# Patient Record
Sex: MALE | Race: WHITE | Employment: UNEMPLOYED | URBAN - NONMETROPOLITAN AREA
[De-identification: names, ages, dates, MRNs, and addresses within clinical notes are randomized per-mention and may not be internally consistent; named-entity substitution may affect disease eponyms.]

---

## 2017-01-25 ENCOUNTER — INITIAL CONSULT (OUTPATIENT)
Dept: SURGERY | Age: 42
End: 2017-01-25

## 2017-01-25 VITALS
WEIGHT: 252 LBS | DIASTOLIC BLOOD PRESSURE: 78 MMHG | SYSTOLIC BLOOD PRESSURE: 128 MMHG | HEART RATE: 100 BPM | BODY MASS INDEX: 31.33 KG/M2 | HEIGHT: 75 IN | TEMPERATURE: 97.1 F

## 2017-01-25 DIAGNOSIS — N45.1 EPIDIDYMITIS: Primary | ICD-10-CM

## 2017-01-25 PROCEDURE — 99203 OFFICE O/P NEW LOW 30 MIN: CPT | Performed by: SURGERY

## 2017-01-25 RX ORDER — LEVOFLOXACIN 750 MG/1
750 TABLET ORAL DAILY
Qty: 10 TABLET | Refills: 0 | Status: SHIPPED | OUTPATIENT
Start: 2017-01-25 | End: 2017-02-04

## 2017-01-25 ASSESSMENT — ENCOUNTER SYMPTOMS
EYES NEGATIVE: 1
GASTROINTESTINAL NEGATIVE: 1
COUGH: 1

## 2017-02-08 ENCOUNTER — OFFICE VISIT (OUTPATIENT)
Dept: SURGERY | Age: 42
End: 2017-02-08

## 2017-02-08 VITALS
WEIGHT: 260 LBS | BODY MASS INDEX: 31.66 KG/M2 | DIASTOLIC BLOOD PRESSURE: 90 MMHG | TEMPERATURE: 98.6 F | HEIGHT: 76 IN | SYSTOLIC BLOOD PRESSURE: 158 MMHG | HEART RATE: 88 BPM

## 2017-02-08 DIAGNOSIS — N45.1 EPIDIDYMITIS: Primary | ICD-10-CM

## 2017-02-08 PROCEDURE — 99212 OFFICE O/P EST SF 10 MIN: CPT | Performed by: SURGERY

## 2017-11-01 ENCOUNTER — INITIAL CONSULT (OUTPATIENT)
Dept: SURGERY | Age: 42
End: 2017-11-01
Payer: MEDICAID

## 2017-11-01 VITALS
HEIGHT: 76 IN | SYSTOLIC BLOOD PRESSURE: 142 MMHG | WEIGHT: 270 LBS | HEART RATE: 89 BPM | BODY MASS INDEX: 32.88 KG/M2 | TEMPERATURE: 98.2 F | DIASTOLIC BLOOD PRESSURE: 98 MMHG

## 2017-11-01 DIAGNOSIS — R10.13 EPIGASTRIC PAIN: Primary | ICD-10-CM

## 2017-11-01 DIAGNOSIS — Z72.0 TOBACCO ABUSE: ICD-10-CM

## 2017-11-01 DIAGNOSIS — K21.9 GASTROESOPHAGEAL REFLUX DISEASE, ESOPHAGITIS PRESENCE NOT SPECIFIED: ICD-10-CM

## 2017-11-01 PROCEDURE — G8484 FLU IMMUNIZE NO ADMIN: HCPCS | Performed by: SURGERY

## 2017-11-01 PROCEDURE — 4004F PT TOBACCO SCREEN RCVD TLK: CPT | Performed by: SURGERY

## 2017-11-01 PROCEDURE — G8417 CALC BMI ABV UP PARAM F/U: HCPCS | Performed by: SURGERY

## 2017-11-01 PROCEDURE — G8427 DOCREV CUR MEDS BY ELIG CLIN: HCPCS | Performed by: SURGERY

## 2017-11-01 PROCEDURE — 99214 OFFICE O/P EST MOD 30 MIN: CPT | Performed by: SURGERY

## 2017-11-01 RX ORDER — AZELASTINE 1 MG/ML
2 SPRAY, METERED NASAL
COMMUNITY
Start: 2017-06-12

## 2017-11-01 ASSESSMENT — ENCOUNTER SYMPTOMS
HEARTBURN: 1
ABDOMINAL DISTENTION: 1
HOARSE VOICE: 1
ABDOMINAL PAIN: 1
HEMATOCHEZIA: 0
COUGH: 1
BLOOD IN STOOL: 0
DIARRHEA: 0
WHEEZING: 1
CONSTIPATION: 1
SORE THROAT: 0
SHORTNESS OF BREATH: 1
TROUBLE SWALLOWING: 0
NAUSEA: 1
VOMITING: 1
VOICE CHANGE: 1

## 2017-11-01 ASSESSMENT — CROHNS DISEASE ACTIVITY INDEX (CDAI): CDAI SCORE: 0

## 2017-11-01 NOTE — LETTER
AMBULATORY SURGERY INSTRUCTIONS    - If you should develop a cold, sore throat, cough, fever or other new indication of illness prior to the scheduled surgery, please notify the [address] [place] [address] office as early as possible. - DO NOT EAT OR DRINK ANYTHING AFTER MIDNIGHT THE NIGHT BEFORE YOUR SURGERY. This includes water, tea, juice, cereal, coffee, etc.    - Refrain from smoking the day of your surgery or procedure. - Wear simple loose clothing, which can be easily changed. - Do not wear any make-up, lipstick or nail polish. - Please leave contact lenses at home or bring container for them. - Leave jewelry (including rings) and other valuables at home. - Make arrangements for a family member or friend to drive you to and from the surgery center. The anesthetic may affect your judgement following surgery and driving a vehicle within 24 hours after the anesthesia could be dangerous. Please remember that a responsible adult must remain in the building at all times during your surgery. - Children should be accompanied by two adults; one to watch the child, the other to drive the vehicle home. - Please shower or bathe both on the evening prior to surgery and on the morning of surgery using an antibacterial soap/Hibiclens    - You may be asked to sign several forms prior to surgery; patients under age 25 have a parent or legal guardian sign the permit to operate.    - DO NOT take aspirin, Aleve, Motrin, Ibuprofen, Naproxen, Vitamins or Herbal supplements for 1 weeks or 7 days prior to the day of surgery.    -The morning of your procedure please take blood pressure, heart, and seizure medications with a small sip of water. Day of procedure report to the [facility], Registration office on the 1st floor in the hospital, after check in you will then go to the second floor.        [facility] requests that all medications are kept in their original bottles and brought to the procedure. PROCEDURE DATE:                                  Estimated surgery arrival time     You will be notified the day before surgery (usually in the afternoon) of your arrival time by the surgery center.

## 2017-11-01 NOTE — PROGRESS NOTES
into the muscle every 30 days      triamcinolone (KENALOG) 0.1 % cream apply to affected area twice a day TO ITCHY AREA ON RIGHT CHEST       No current facility-administered medications for this visit. Allergies   Allergen Reactions    Penicillin V Hives     Social History   Substance Use Topics    Smoking status: Current Every Day Smoker     Packs/day: 2.00    Smokeless tobacco: Former User    Alcohol use No     Family History   Problem Relation Age of Onset    Cancer Father      skin     Review of Systems   Constitutional: Negative for activity change, appetite change, fever, unexpected weight change and weight loss. HENT: Positive for hoarse voice and voice change. Negative for sore throat and trouble swallowing. Respiratory: Positive for cough, shortness of breath and wheezing. Cardiovascular: Negative for chest pain. Gastrointestinal: Positive for abdominal distention, abdominal pain, constipation, heartburn, nausea and vomiting. Negative for anorexia, blood in stool, diarrhea and hematochezia. Melena:  black stools. Genitourinary: Positive for hematuria. Negative for difficulty urinating and dysuria. Musculoskeletal: Negative for arthralgias and myalgias. Skin: Negative for rash and wound. Neurological: Positive for numbness ( right leg) and headaches. Negative for dizziness, seizures, syncope, speech difficulty, weakness and light-headedness. Hematological: Does not bruise/bleed easily. Psychiatric/Behavioral: Positive for agitation, behavioral problems, confusion, decreased concentration and dysphoric mood. The patient is nervous/anxious. BP (!) 142/98   Pulse 89   Temp 98.2 °F (36.8 °C) (Temporal)   Ht 6' 4\" (1.93 m)   Wt 270 lb (122.5 kg)   BMI 32.87 kg/m²     Objective:   Physical Exam   Constitutional: He is oriented to person, place, and time. He appears well-developed and well-nourished. No distress. HENT:   Head: Normocephalic and atraumatic.

## 2020-01-30 ENCOUNTER — OFFICE VISIT (OUTPATIENT)
Dept: WOUND CARE | Age: 45
End: 2020-01-30
Payer: MEDICARE

## 2020-01-30 VITALS
HEIGHT: 76 IN | BODY MASS INDEX: 33.61 KG/M2 | WEIGHT: 276 LBS | DIASTOLIC BLOOD PRESSURE: 88 MMHG | TEMPERATURE: 98.5 F | SYSTOLIC BLOOD PRESSURE: 168 MMHG | HEART RATE: 88 BPM

## 2020-01-30 PROCEDURE — 29580 STRAPPING UNNA BOOT: CPT | Performed by: SURGERY

## 2020-01-30 PROCEDURE — 99202 OFFICE O/P NEW SF 15 MIN: CPT | Performed by: SURGERY

## 2020-01-30 RX ORDER — HYDROCHLOROTHIAZIDE 12.5 MG/1
CAPSULE, GELATIN COATED ORAL
COMMUNITY
Start: 2020-01-28

## 2020-01-30 RX ORDER — SULFAMETHOXAZOLE AND TRIMETHOPRIM 800; 160 MG/1; MG/1
TABLET ORAL
COMMUNITY
Start: 2020-01-28 | End: 2020-02-13

## 2020-01-30 RX ORDER — BUMETANIDE 1 MG/1
TABLET ORAL
COMMUNITY
Start: 2020-01-28

## 2020-01-30 RX ORDER — FUROSEMIDE 20 MG/1
TABLET ORAL
COMMUNITY
Start: 2019-11-06 | End: 2020-02-13

## 2020-01-30 NOTE — PROGRESS NOTES
Patient is referred with ulcer on his legs. It is not clear how long they have been present. He has poorly controlled CHF. Smokes cigarettes and probably is an alcoholic. He denies drinking today, but has a strong smell of alcohol. Past Medical History:   Diagnosis Date    Depression     Elevated BP     Genital warts     History of broken nose     Nicotine dependence      Past Surgical History:   Procedure Laterality Date    WISDOM TOOTH EXTRACTION       Current Outpatient Medications   Medication Sig Dispense Refill    bumetanide (BUMEX) 1 MG tablet       hydrochlorothiazide (MICROZIDE) 12.5 MG capsule       furosemide (LASIX) 20 MG tablet       sulfamethoxazole-trimethoprim (BACTRIM DS;SEPTRA DS) 800-160 MG per tablet       azelastine (ASTELIN) 0.1 % nasal spray 2 sprays by Nasal route      ARIPiprazole (ABILIFY MAINTENA) 400 MG SUSR Inject 400 mg into the muscle every 30 days      triamcinolone (KENALOG) 0.1 % cream apply to affected area twice a day TO ITCHY AREA ON RIGHT CHEST       No current facility-administered medications for this visit. Allergies   Allergen Reactions    Penicillin V Hives     Social History     Tobacco Use    Smoking status: Current Every Day Smoker     Packs/day: 2.00    Smokeless tobacco: Former User   Substance Use Topics    Alcohol use: Yes    Drug use: No     Family History   Problem Relation Age of Onset    Cancer Father         skin       Temp 98.5 °F (36.9 °C) (Tympanic)   Ht 6' 3.98\" (1.93 m)   Wt 276 lb (125.2 kg)   BMI 33.61 kg/m²     Patient is somnolent. Slurring his speech. He has multiple circular ulcer on his legs bilaterally. At least one of these on the right penetrates into the subcutaneous tissue. All are fairly small less than 2 cm across. He has marked pitting edema bilaterally. He has good palpable pedal pulses bilaterally. Lab work form yesterday shows very low albumin. Elevated BNP.   TSH is normal.    IMP/PLAN  1) Multiple small ulcer on legs bilaterally. Probably mixed etiology. Consider erythema nodosum, pyogenic granulomas, venous stasis ulcer, folliculitis, purigo nodularis. - Will try Unna boots.   Aggressive compression is contra-indicated with CHF  2) Severe Hypoalbuminemia  3) Tobacco abuse  4) Alcohol abuse  5) Recheck in 1 week  6) Acute Intoxication

## 2020-01-30 NOTE — PATIENT INSTRUCTIONS
Today a compression dressing has been applied to your lower leg. Its purpose is to reduce swelling and help treat your wound. It will stay on until your next appointment. 1. It must stay dry. You can shower with a bag covering it or purchase a shower boot at  a medical supply store. 2. If the dressing falls more than 2 inches or gets wet, call us ([phone]) to come in  to have it changed. 3. If the top or bottom rolls, you can snip through it to relieve the constriction. 4. To help reduce swelling, when sitting elevate your leg, preferably to heart level. Avoid standing in one place for long periods of time. 5.  A rare complication is a decrease of arterial blood flow to your toes. If your   leg becomes more painful with numbness or tingling or a purple color to your toes,  carefully remove the dressing by cutting it off or unwrapping it. If normal sensation does not return to the limb, seek medical help.

## 2020-02-03 ENCOUNTER — TELEPHONE (OUTPATIENT)
Dept: SURGERY | Age: 45
End: 2020-02-03

## 2020-02-03 NOTE — TELEPHONE ENCOUNTER
Patient called stating the wraps were so itching, he couldn't stand it any longer so he took the wraps off, but wanted to double ck to see when his next appt was, let him know when his next appt was. 2/5/2020 .

## 2020-02-05 ENCOUNTER — OFFICE VISIT (OUTPATIENT)
Dept: SURGERY | Age: 45
End: 2020-02-05
Payer: MEDICARE

## 2020-02-05 VITALS
DIASTOLIC BLOOD PRESSURE: 80 MMHG | WEIGHT: 274 LBS | SYSTOLIC BLOOD PRESSURE: 134 MMHG | RESPIRATION RATE: 18 BRPM | HEART RATE: 81 BPM | BODY MASS INDEX: 33.36 KG/M2 | TEMPERATURE: 98.8 F | HEIGHT: 76 IN

## 2020-02-05 PROBLEM — L97.221 VENOUS STASIS ULCER OF LEFT CALF LIMITED TO BREAKDOWN OF SKIN WITHOUT VARICOSE VEINS (HCC): Status: ACTIVE | Noted: 2020-02-05

## 2020-02-05 PROBLEM — I87.2 VENOUS STASIS ULCER OF RIGHT CALF WITH FAT LAYER EXPOSED WITHOUT VARICOSE VEINS (HCC): Status: ACTIVE | Noted: 2020-02-05

## 2020-02-05 PROBLEM — I87.2 VENOUS STASIS ULCER OF LEFT CALF LIMITED TO BREAKDOWN OF SKIN WITHOUT VARICOSE VEINS (HCC): Status: ACTIVE | Noted: 2020-02-05

## 2020-02-05 PROBLEM — L97.212 VENOUS STASIS ULCER OF RIGHT CALF WITH FAT LAYER EXPOSED WITHOUT VARICOSE VEINS (HCC): Status: ACTIVE | Noted: 2020-02-05

## 2020-02-05 PROBLEM — I50.43 CHF (CONGESTIVE HEART FAILURE), NYHA CLASS III, ACUTE ON CHRONIC, COMBINED (HCC): Status: ACTIVE | Noted: 2019-12-17

## 2020-02-05 PROCEDURE — 29580 STRAPPING UNNA BOOT: CPT | Performed by: SURGERY

## 2020-02-05 NOTE — PATIENT INSTRUCTIONS
Today a compression dressing has been applied to your lower leg. Its purpose is to reduce swelling and help treat your wound. It will stay on until your next appointment. 1. It must stay dry. You can shower with a bag covering it or purchase a shower boot at  a medical supply store. 2. If the dressing falls more than 2 inches or gets wet, call us ([phone]) to come in  to have it changed. 3. If the top or bottom rolls, you can snip through it to relieve the constriction. 4. To help reduce swelling, when sitting elevate your leg, preferably to heart level. Avoid standing in one place for long periods of time. 5.  A rare complication is a decrease of arterial blood flow to your toes. If your   leg becomes more painful with numbness or tingling or a purple color to your toes,  carefully remove the dressing by cutting it off or unwrapping it. If normal sensation does not return to the limb, seek medical help.

## 2020-02-13 ENCOUNTER — OFFICE VISIT (OUTPATIENT)
Dept: SURGERY | Age: 45
End: 2020-02-13
Payer: MEDICARE

## 2020-02-13 VITALS
TEMPERATURE: 98.7 F | DIASTOLIC BLOOD PRESSURE: 80 MMHG | BODY MASS INDEX: 31.17 KG/M2 | HEIGHT: 76 IN | SYSTOLIC BLOOD PRESSURE: 138 MMHG | RESPIRATION RATE: 20 BRPM | HEART RATE: 94 BPM | WEIGHT: 256 LBS

## 2020-02-13 PROCEDURE — 99212 OFFICE O/P EST SF 10 MIN: CPT | Performed by: SURGERY

## 2020-02-13 PROCEDURE — 99213 OFFICE O/P EST LOW 20 MIN: CPT

## 2020-02-13 NOTE — PROGRESS NOTES
Patient has bilateral leg ulcer, with a larger ulcer on there right than the left.     He has multiple medical and social issues:  - CHF  Uncontrolled  - Ongoing tobacco abuse  - Alcohol abuse    Cut his Unna boots off again. /80 (Site: Left Upper Arm, Position: Sitting, Cuff Size: Large Adult)   Pulse 94   Temp 98.7 °F (37.1 °C) (Tympanic)   Resp 20   Ht 6' 3.98\" (1.93 m)   Wt 256 lb (116.1 kg)   BMI 31.17 kg/m²     Legs are stable    Imp/Plan  1) He clearly is not going to keep compression dressings on. 2) Will try compression stockings. (I suspect he will not use these either.)  - Until the stocking are available will try Tubigrip. 3) Recheck in a week.

## 2022-11-02 ENCOUNTER — HOSPITAL ENCOUNTER (EMERGENCY)
Age: 47
Discharge: HOME | End: 2022-11-02
Attending: SPECIALIST
Payer: MEDICARE

## 2022-11-02 VITALS
TEMPERATURE: 96.5 F | HEART RATE: 86 BPM | BODY MASS INDEX: 30.44 KG/M2 | WEIGHT: 250 LBS | DIASTOLIC BLOOD PRESSURE: 133 MMHG | OXYGEN SATURATION: 98 % | RESPIRATION RATE: 16 BRPM | SYSTOLIC BLOOD PRESSURE: 184 MMHG

## 2022-11-02 DIAGNOSIS — T69.9XXA COLD EXPOSURE, INITIAL ENCOUNTER: Primary | ICD-10-CM

## 2022-11-02 PROCEDURE — 99282 EMERGENCY DEPT VISIT SF MDM: CPT

## 2022-11-02 ASSESSMENT — ENCOUNTER SYMPTOMS
ABDOMINAL PAIN: 0
BACK PAIN: 0
SHORTNESS OF BREATH: 0

## 2022-11-02 ASSESSMENT — PAIN - FUNCTIONAL ASSESSMENT: PAIN_FUNCTIONAL_ASSESSMENT: 0-10

## 2022-11-02 ASSESSMENT — PAIN SCALES - GENERAL: PAINLEVEL_OUTOF10: 5

## 2022-11-02 NOTE — DISCHARGE INSTRUCTIONS
Please understand that at this time there is no evidence for a more serious underlying process, but that early in the process of an illness or injury, an emergency department workup can be falsely reassuring. You should contact your family doctor within the next 48 hours for a follow up appointment    Brittani Garcia!!!    From [facility] ([facility]) and [facility]    On behalf of the Emergency Department staff at [facility]), I would like to thank you for giving us the opportunity to address your health care needs and concerns. We hope that during your visit, our service was delivered in a professional and caring manner. Please keep [facility] ([facility]) in mind as we walk with you down the path to your own personal wellness. Please expect an automated text message or email from us so we can ask a few questions about your health and progress. Based on your answers, a clinician may call you back to offer help and instructions. Please understand that early in the process of an illness or injury, an emergency department workup can be falsely reassuring. If you notice any worsening, changing or persistent symptoms please call your family doctor or return to the ER immediately. Tell us how we did during your visit at [email or web address]   and let us know about your experience

## 2022-11-02 NOTE — ED PROVIDER NOTES
[address]      Pt Name: Viridiana Garcia  MRN: [id number]  [place] 1975  Date of evaluation: 11/2/2022      CHIEF COMPLAINT       Chief Complaint   Patient presents with    Homeless     States his hands and toes are cold, he has been outside 3 hours in the cold         HISTORY OF PRESENT ILLNESS    Viridiana Garcia is a 52 y.o. male who presents to the emergency department stating that his hands and feet are cold. He has been outside for about 3 hours prior to arrival.  Patient states he was late getting into the [facility] which is homeless shelter and has no where to go. He is a poor historian and is reluctant to give any information upon arrival.  He has history of schizophrenia and is supposed to take Abilify 400 mg intramuscularly every 30 days but he is noncompliant with his all the medications. He states that he is just here to get his hands and feet warm. He denies any redness, ulcers in the fingers or toes and denies any chest pain, shortness of breath, abdominal pain, vomiting or diarrhea. He admits to having some tingling sensation in the hands and the feet. No history of fever or chills. His appetite has been normal and he denies any vomiting or diarrhea. REVIEW OF SYSTEMS       Review of Systems   Constitutional:  Negative for chills and fever. Respiratory:  Negative for shortness of breath. Cardiovascular:  Negative for chest pain. Gastrointestinal:  Negative for abdominal pain. Musculoskeletal:  Negative for back pain, gait problem and neck pain. Neurological:  Negative for weakness, numbness and headaches. All other systems reviewed and are negative. PAST MEDICAL HISTORY    has a past medical history of Depression, Elevated BP, Genital warts, History of broken nose, and Nicotine dependence. SURGICAL HISTORY      has a past surgical history that includes [place] tooth extraction.     CURRENT MEDICATIONS Previous Medications    ARIPIPRAZOLE ER (ABILIFY MAINTENA) 400 MG SUSR    Inject 400 mg into the muscle every 30 days    AZELASTINE (ASTELIN) 0.1 % NASAL SPRAY    2 sprays by Nasal route    BUMETANIDE (BUMEX) 1 MG TABLET        HYDROCHLOROTHIAZIDE (MICROZIDE) 12.5 MG CAPSULE           ALLERGIES     is allergic to penicillin v.    FAMILY HISTORY     He indicated that his mother is alive. He indicated that his father is alive. family history includes Cancer in his father. SOCIAL HISTORY      reports that he has been smoking. He has been smoking an average of 2 packs per day. He has quit using smokeless tobacco. He reports current alcohol use. He reports that he does not use drugs. PHYSICAL EXAM     INITIAL VITALS:  weight is 250 lb (113.4 kg). His tympanic temperature is 96.5 °F (35.8 °C) (abnormal). His blood pressure is 184/133 (abnormal) and his pulse is 86. His respiration is 16 and oxygen saturation is 98%. Physical Exam  Vitals and nursing note reviewed. Constitutional:       General: He is not in acute distress. Appearance: He is well-developed. HENT:      Head: Normocephalic and atraumatic. Nose: Nose normal.   Eyes:      Extraocular Movements: Extraocular movements intact. Pupils: Pupils are equal, round, and reactive to light. Cardiovascular:      Rate and Rhythm: Normal rate and regular rhythm. Heart sounds: Normal heart sounds. No murmur heard. Pulmonary:      Effort: Pulmonary effort is normal. No respiratory distress. Breath sounds: Normal breath sounds. Abdominal:      General: Bowel sounds are normal. There is no distension. Palpations: Abdomen is soft. Tenderness: There is no abdominal tenderness. Musculoskeletal:      Cervical back: Normal range of motion and neck supple. Skin:     General: Skin is cool and dry. Findings: No erythema or petechiae. Rash is not purpuric.       Comments: Once hands and feet are cool to touch but there is no evidence of erythema, edema or tenderness. His dorsalis pedis, posterior tibial and radial pulses are equal bilaterally his motor and sensory examinations normal distally. Neurological:      General: No focal deficit present. Mental Status: He is alert and oriented to person, place, and time. GCS: GCS eye subscore is 4. GCS verbal subscore is 5. GCS motor subscore is 6. Sensory: Sensation is intact. Motor: Motor function is intact. Gait: Gait is intact. Psychiatric:         Behavior: Behavior normal.         Thought Content: Thought content normal.        DIFFERENTIAL DIAGNOSIS/ MDM:     Cold exposure, no evidence of frostbite, noncompliance, uncontrolled hypertension    DIAGNOSTIC RESULTS     EKG: All EKG's are interpreted by the Emergency Department Physician who either signs or Co-signs this chart in the absence of a cardiologist.    Patient refused      RADIOLOGY:   Interpretation per the Radiologist below, if available at the time of this note:    No results found. ED BEDSIDE ULTRASOUND:       LABS:  Labs Reviewed - No data to display    Patient refused any lab work    EMERGENCY DEPARTMENT COURSE:   Vitals:    Vitals:    11/02/22 0015 11/02/22 0120   BP:  (!) 184/133   Pulse: 86    Resp: 16    Temp: (!) 96.5 °F (35.8 °C)    TempSrc: Tympanic    SpO2: 98%    Weight: 250 lb (113.4 kg)      -------------------------  BP: (!) 184/133, Temp: (!) 96.5 °F (35.8 °C), Heart Rate: 86, Resp: 16    No orders of the defined types were placed in this encounter. During the emergency department course, warm blankets were provided to the patient and he started feeling better. He was given oral fluids and snacks which he tolerated well. He refuses any medications for blood pressure and refuses any work-up.   Plan is to discharge the patient with instructions drink plenty of oral fluids, restart all the medications as recommended by his PCP and psychiatrist, call their offices in the morning for follow-up, return if worse. I have reviewed the disposition diagnosis with the patient and or their family/guardian. I have answered their questions and given discharge instructions. They voiced understanding of these instructions and did not have any further questions or complaints. Re-evaluation Notes    Upon reevaluation, patient is resting comfortably and does not appear to be in any pain or distress. He remained neurologically intact throughout the ED course. CRITICAL CARE:   None        CONSULTS:      PROCEDURES:  None    FINAL IMPRESSION      1. Cold exposure, initial encounter          DISPOSITION/PLAN   DISPOSITION Decision To Discharge    Condition on Disposition    Stable    PATIENT REFERRED TO:  Keith Barragan    Call in 1 day  For reevaluation of current symptoms    [facility] ED  [address] [phone]    If symptoms worsen      DISCHARGE MEDICATIONS:  New Prescriptions    No medications on file       (Please note that portions of this note were completed with a voice recognition program.  Efforts were made to edit the dictations but occasionally words are mis-transcribed.)    Jimenez Del Valle MD,, MD, F.A.C.E.P.   Attending Emergency Physician                          Jimenez Del Valle MD  11/02/22 2753

## 2022-11-02 NOTE — ED NOTES
Patient arrives to ED w/ CC of cold hands and feet. Per patient, he has been outside in the cold for an extended amount of time with nowhere to go. Patient reluctant to give information and not the best historian. Patient states he has a hx of schizophrenia and has not been taking any of his prescribed medications. He refused for writer to obtain blood pressure or perform an assessment on patient. He states he is just here to get his hands and feet warm because they are cold. He states [facility] but he was \"too late\" to get warm so came here. He states he does have a  \"[place]\" who he says will be here at 0800 in the AM to get him somewhere to stay. He states he was in a group home in [place] but got kicked out because he \"must've pissed them off\" so he cannot go back there. When asked if he would like writer to contact his parents he refuses because he is \"on his own\". Requesting writer to call [employer] in [place], [place] and to have them call him because it is \"urgent\". Writer informed patient they do not open until 0900 and replied \"forget it\".  Patient resting comfortably, water and crackers given per request.     Leatha Seals RN  11/02/22 0200       Leatha Seals RN  11/02/22 0201

## 2022-11-03 ENCOUNTER — HOSPITAL ENCOUNTER (EMERGENCY)
Age: 47
Discharge: ANOTHER HOSPITAL | End: 2022-11-04
Attending: EMERGENCY MEDICINE
Payer: MEDICARE

## 2022-11-03 VITALS
TEMPERATURE: 98.1 F | RESPIRATION RATE: 16 BRPM | HEART RATE: 85 BPM | SYSTOLIC BLOOD PRESSURE: 161 MMHG | OXYGEN SATURATION: 95 % | BODY MASS INDEX: 28.01 KG/M2 | HEIGHT: 76 IN | DIASTOLIC BLOOD PRESSURE: 124 MMHG | WEIGHT: 230 LBS

## 2022-11-03 DIAGNOSIS — F20.9 SCHIZOPHRENIA, UNSPECIFIED TYPE (HCC): ICD-10-CM

## 2022-11-03 DIAGNOSIS — R45.1 AGITATION: Primary | ICD-10-CM

## 2022-11-03 LAB
ABSOLUTE EOS #: 0.18 K/UL (ref 0–0.44)
ABSOLUTE IMMATURE GRANULOCYTE: 0.03 K/UL (ref 0–0.3)
ABSOLUTE LYMPH #: 1.87 K/UL (ref 1.1–3.7)
ABSOLUTE MONO #: 0.75 K/UL (ref 0.1–1.2)
ACETAMINOPHEN LEVEL: <5 UG/ML (ref 10–30)
ANION GAP SERPL CALCULATED.3IONS-SCNC: 8 MMOL/L (ref 9–17)
BASOPHILS # BLD: 1 % (ref 0–2)
BASOPHILS ABSOLUTE: 0.05 K/UL (ref 0–0.2)
BUN BLDV-MCNC: 18 MG/DL (ref 6–20)
BUN/CREAT BLD: 20 (ref 9–20)
CALCIUM SERPL-MCNC: 9.4 MG/DL (ref 8.6–10.4)
CHLORIDE BLD-SCNC: 103 MMOL/L (ref 98–107)
CO2: 29 MMOL/L (ref 20–31)
CREAT SERPL-MCNC: 0.89 MG/DL (ref 0.7–1.2)
EOSINOPHILS RELATIVE PERCENT: 2 % (ref 1–4)
ETHANOL: <10 MG/DL
GFR SERPL CREATININE-BSD FRML MDRD: >60 ML/MIN/1.73M2
GLUCOSE BLD-MCNC: 98 MG/DL (ref 70–99)
HCT VFR BLD CALC: 47.3 % (ref 40.7–50.3)
HEMOGLOBIN: 15.3 G/DL (ref 13–17)
IMMATURE GRANULOCYTES: 0 %
LYMPHOCYTES # BLD: 22 % (ref 24–43)
MCH RBC QN AUTO: 27.8 PG (ref 25.2–33.5)
MCHC RBC AUTO-ENTMCNC: 32.3 G/DL (ref 25.2–33.5)
MCV RBC AUTO: 86 FL (ref 82.6–102.9)
MONOCYTES # BLD: 9 % (ref 3–12)
NRBC AUTOMATED: 0 PER 100 WBC
PDW BLD-RTO: 13.8 % (ref 11.8–14.4)
PLATELET # BLD: 203 K/UL (ref 138–453)
PMV BLD AUTO: 11.1 FL (ref 8.1–13.5)
POTASSIUM SERPL-SCNC: 4.6 MMOL/L (ref 3.7–5.3)
RBC # BLD: 5.5 M/UL (ref 4.21–5.77)
SALICYLATE LEVEL: <1 MG/DL (ref 3–10)
SARS-COV-2, RAPID: NOT DETECTED
SEG NEUTROPHILS: 66 % (ref 36–65)
SEGMENTED NEUTROPHILS ABSOLUTE COUNT: 5.59 K/UL (ref 1.5–8.1)
SODIUM BLD-SCNC: 140 MMOL/L (ref 135–144)
SPECIMEN DESCRIPTION: NORMAL
WBC # BLD: 8.5 K/UL (ref 3.5–11.3)

## 2022-11-03 PROCEDURE — 87635 SARS-COV-2 COVID-19 AMP PRB: CPT

## 2022-11-03 PROCEDURE — 80179 DRUG ASSAY SALICYLATE: CPT

## 2022-11-03 PROCEDURE — 85025 COMPLETE CBC W/AUTO DIFF WBC: CPT

## 2022-11-03 PROCEDURE — 80048 BASIC METABOLIC PNL TOTAL CA: CPT

## 2022-11-03 PROCEDURE — 99285 EMERGENCY DEPT VISIT HI MDM: CPT

## 2022-11-03 PROCEDURE — G0480 DRUG TEST DEF 1-7 CLASSES: HCPCS

## 2022-11-03 PROCEDURE — 80143 DRUG ASSAY ACETAMINOPHEN: CPT

## 2022-11-03 PROCEDURE — 36415 COLL VENOUS BLD VENIPUNCTURE: CPT

## 2022-11-03 ASSESSMENT — PAIN - FUNCTIONAL ASSESSMENT: PAIN_FUNCTIONAL_ASSESSMENT: NONE - DENIES PAIN

## 2022-11-03 ASSESSMENT — LIFESTYLE VARIABLES: HOW OFTEN DO YOU HAVE A DRINK CONTAINING ALCOHOL: NEVER
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
## 2022-11-04 ENCOUNTER — HOSPITAL ENCOUNTER (INPATIENT)
Age: 47
LOS: 7 days | Discharge: HOME | DRG: 885 | End: 2022-11-11
Attending: PSYCHIATRY & NEUROLOGY | Admitting: PSYCHIATRY & NEUROLOGY
Payer: MEDICARE

## 2022-11-04 PROBLEM — F15.20 AMPHETAMINE USE DISORDER, SEVERE (HCC): Chronic | Status: ACTIVE | Noted: 2022-11-04

## 2022-11-04 PROCEDURE — 1240000000 HC EMOTIONAL WELLNESS R&B

## 2022-11-04 PROCEDURE — 6370000000 HC RX 637 (ALT 250 FOR IP): Performed by: PSYCHIATRY & NEUROLOGY

## 2022-11-04 RX ORDER — ACETAMINOPHEN 325 MG/1
650 TABLET ORAL EVERY 4 HOURS PRN
Status: DISCONTINUED | OUTPATIENT
Start: 2022-11-04 | End: 2022-11-11 | Stop reason: HOSPADM

## 2022-11-04 RX ORDER — IBUPROFEN 400 MG/1
400 TABLET ORAL EVERY 6 HOURS PRN
Status: DISCONTINUED | OUTPATIENT
Start: 2022-11-04 | End: 2022-11-11 | Stop reason: HOSPADM

## 2022-11-04 RX ORDER — POLYETHYLENE GLYCOL 3350 17 G/17G
17 POWDER, FOR SOLUTION ORAL DAILY PRN
Status: DISCONTINUED | OUTPATIENT
Start: 2022-11-04 | End: 2022-11-11 | Stop reason: HOSPADM

## 2022-11-04 RX ORDER — TRAZODONE HYDROCHLORIDE 50 MG/1
50 TABLET ORAL NIGHTLY PRN
Status: DISCONTINUED | OUTPATIENT
Start: 2022-11-04 | End: 2022-11-11 | Stop reason: HOSPADM

## 2022-11-04 RX ORDER — HYDROXYZINE HYDROCHLORIDE 25 MG/1
50 TABLET, FILM COATED ORAL 3 TIMES DAILY PRN
Status: DISCONTINUED | OUTPATIENT
Start: 2022-11-04 | End: 2022-11-11 | Stop reason: HOSPADM

## 2022-11-04 RX ADMIN — LURASIDONE HYDROCHLORIDE 40 MG: 40 TABLET, FILM COATED ORAL at 11:58

## 2022-11-04 ASSESSMENT — LIFESTYLE VARIABLES
HOW OFTEN DO YOU HAVE A DRINK CONTAINING ALCOHOL: PATIENT DECLINED
HOW MANY STANDARD DRINKS CONTAINING ALCOHOL DO YOU HAVE ON A TYPICAL DAY: PATIENT DECLINED

## 2022-11-04 ASSESSMENT — PAIN SCALES - GENERAL
PAINLEVEL_OUTOF10: 0
PAINLEVEL_OUTOF10: 0

## 2022-11-04 ASSESSMENT — SLEEP AND FATIGUE QUESTIONNAIRES
DO YOU HAVE DIFFICULTY SLEEPING: COMMENT
DO YOU USE A SLEEP AID: COMMENT

## 2022-11-04 NOTE — PLAN OF CARE
Problem: Chronic Conditions and Co-morbidities  Goal: Patient's chronic conditions and co-morbidity symptoms are monitored and maintained or improved  Outcome: Progressing     Problem: Discharge Planning  Goal: Discharge to home or other facility with appropriate resources  Outcome: Progressing  Note: Staff working on discharge planning     Problem: Psychosis  Goal: Will report no hallucinations or delusions  Description: INTERVENTIONS:  1. Administer medication as  ordered  2. Assist with reality testing to support increasing orientation  3. Assess if patient's hallucinations or delusions are encouraging self harm or harm to others and intervene as appropriate  Outcome: Progressing  Note: Denies hallucinations when questioned     Problem: Behavior  Goal: Pt/Family maintain appropriate behavior and adhere to behavioral management agreement, if implemented  Description: INTERVENTIONS:  1. Assess patient/family's coping skills and  non-compliant behavior (including use of illegal substances)  2. Notify security of behavior or suspected illegal substances which indicate the need for search of the family and/or belongings  3. Encourage verbalization of thoughts and concerns in a socially appropriate manner  4. Utilize positive, consistent limit setting strategies supporting safety of patient, staff and others  5. Encourage participation in the decision making process about the behavioral management agreement  6. If a visitor's behavior poses a threat to safety call refer to organization policy. 7. Initiate consult with , Psychosocial CNS, Spiritual Care as appropriate  Outcome: Progressing     Problem: Involuntary Admit  Goal: Will cooperate with staff recommendations and doctor's orders and will demonstrate appropriate behavior  Description: INTERVENTIONS:  1. Treat underlying conditions and offer medication as ordered  2. Educate regarding involuntary admission procedures and rules  3.  Contain excessive/inappropriate behavior per unit and hospital policies  Outcome: Progressing  Goal: Risk of elopement will decrease  Description: Risk of elopement will decrease  Outcome: Progressing     Problem: Pain  Goal: Verbalizes/displays adequate comfort level or baseline comfort level  Outcome: Progressing  Note: Denies pain when questioned   Care plan reviewed with patient . Patient  verbalize understanding of the plan of care and contribute to goal setting.

## 2022-11-04 NOTE — PROGRESS NOTES
Psychotherapy group 1330-Pt did not attend group. Pt has been isolative and asleep other than eating his lunch.
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
## 2022-11-04 NOTE — PROGRESS NOTES
Behavioral Health   Admission Note   Admission Type: Involuntary         Patient Strengths/Barriers  Strengths (Must Choose Two): Other (comment) Jinx Scheuermann)  Barriers: Other (comment) (terrell)    Addictive Behavior  In the Past 3 Months, Have You Felt or Has Someone Told You That You Have a Problem With  : Other (comment) Jinx Scheuermann)    Medical Problems:   Past Medical History:   Diagnosis Date    Depression     Elevated BP     Genital warts     History of broken nose     Nicotine dependence        Status EXAM:  Mental Status and Behavioral Exam  Normal: No  Level of Assistance: Independent/Self  Facial Expression: Flat, Worried  Affect: Blunt  Level of Consciousness: Alert  Frequency of Checks: 4 times per hour, close  Mood:Normal: No  Mood: Depressed, Anxious, Irritable  Motor Activity:Normal: No  Motor Activity: Decreased  Eye Contact: Good  Observed Behavior: Agitated  Sexual Misconduct History:  Jinx Scheuermann)  Preception: [place] to person, Others (comment) (terrell-refused)  Attention:Normal: No  Attention: Distractible, Unable to concentrate  Thought Processes: Circumstantial  Thought Content:Normal:  (terrell)  Depression Symptoms: Other (comment) (terrell)  Anxiety Symptoms: Other (comment) (terrell)  Ericka Symptoms: Other (comment) (terrell)  Hallucinations: Unable to assess  Delusions:  Jinx Scheuermann)  Memory:Normal:  Jinx Scheuermann)  Insight and Judgment:  (terrell)    Pt admitted with followings belongings:  Dental Appliances: None  Vision - Corrective Lenses: None  Hearing Aid: None  Jewelry: None  Body Piercings Removed: N/A  Clothing: Belt, Footwear, Pants, Shirt, Shorts  Other Valuables: Wallet, Lighter/Matches, Other (Comment) (sun glasses)     Admission order obtained Yes  Patient oriented to surroundings and program expectations and copy of patient rights given. Received admission packet:  Yes  Consents reviewed, signed Refused. Outcomes Questionnaire completed Refused.   \"An Important Message from [facility] About Your Rights\" form reviewed, signed: no . Patient verbalize understanding: No.    Patient informed of 15 minute safety monitoring: YES/NO/NA: yes          Patient screened positive for suicide risk on CSSR-S (\"yes\" to question #4, 5, OR 6)  yes. Physician notified of risk score: yes  Constant Observer Orders received no . 2 person skin assessment completed upon admission refused . Explained patients right to have family, representative or physician notified of their admission. Patient has Declined for physician to be notified. Patient has Declined for family/representative to be notified. Provided pt with [employer] handout entitled \"Quitting Smoking. \"  Reviewed handout with pt addressing dangers of smoking, developing coping skills, and providing basic information about quitting. Pt response to counseling:  refused     Admission summary: Pt arrived on unit with campus police and EMS. Pt is alert and oriented x 1 person only, patient refused admission assessment and vital signs. Pt ate snack. Was able to deny suicidal and homicidal ideations then started to become agitated and states \"I am tired. I just want to go to bed\". Pt was then showed to his room and given welcome folder. Pt is currently resting in bed at this time. Per Fabiola Benz RN, Dr Major Diggs would like routine orders, vraylar 3 mg daily, Geodon 10 mg IM 3 x day prn, Trazodone 50 mg nightly prn, Atarax 50 mg 3 x day prn and Abilify Maintena 400 mg Injection every 3 weeks due on 11-8-22- see new orders.            Arely Mills RN

## 2022-11-04 NOTE — BH NOTE
INPATIENT RECREATIONAL THERAPY  ADULT BEHAVIORAL SERVICES  EVALUATION    REFERRING PHYSICIAN:   Dr. Angie Robles  DIAGNOSIS:   Schizophrenia  PRECAUTIONS:  Standard precautions    HISTORY OF PRESENT ILLNESS/INJURY:   Patient was brought into the hospital by police due to demonstrating aggressive behavior. Patient has a history of schizophrenia. Patient has a history of auditory and visual hallucinations. Patient reported that he was mad at his  because his  has his money. Patient cooperative but guarded. PMH:  Please see medical chart for prior medical history, allergies, and medication    HISTORY OF PSYCHIATRIC TREATMENT:  OP: Óscar Valdes OF BIRTH:   [birth year]  GENDER:   male  MARITAL STATUS:  single  EMPLOYMENT STATUS:  Disability    LIVING SITUATION/SUPPORT:  Patient is currently homeless. Patient does have a . EDUCATIONAL LEVEL:  did not assess    MEDICATION/DRUG USE:  Alcohol use. History of medication noncompliance. LEISURE INTERESTS:    \"sleep\"  ACTIVITY PREFERENCE:   Individual  ACTIVITY TYPES:   Passive. Indoor. COGNITION:  Patient appeared to be alert to person and place. COPING:   poor  ATTENTION:    poor  RELAXATION:  Patient reported anxiety and poor sleep prior to admission. Patient has been isolating in room. SELF-ESTEEM:   fair  MOTIVATION:    poor    SOCIAL SKILLS:   poor  FRUSTRATION TOLERANCE:     poor  -  patient has been reported to be agitated. ATTENTION SEEKING:  isolating in room  COOPERATION:  guarded but cooperative   AFFECT:  restricted  APPEARANCE:  appropriate    HEARING:   no problems noted  VISION:  no problems noted  VERBAL COMMUNICATION:   guarded - patient was reported to be talking to himself at times. WRITTEN COMMUNICATION:   did not assess    COORDINATION:    No problems noted  MOBILITY:  Ambulates independently  GOALS:   Identify 2 new positive coping skills by time of discharge.
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
## 2022-11-04 NOTE — PLAN OF CARE
Patient has not attended any groups today and has not been out of his room to socialize with others this shift so he has not been able to demonstrate effective coping skills.    Problem: Coping  Goal: Pt/Family able to verbalize concerns and demonstrate effective coping strategies  Outcome: Not Progressing

## 2022-11-04 NOTE — PROGRESS NOTES
Pt arrived on unit with EMS and campus police. Pt was given snack and refused admission assessment and refused vital signs. Pt was able to state that he denies homicidal and suicidal ideations.

## 2022-11-04 NOTE — PROGRESS NOTES
[facility]  Initial Interdisciplinary Treatment Plan NOTE    Review Date & Time: 11/04/22     Patient was not in treatment team.  See Multidisciplinary Treatment Team sheet for participants. Admission Type:   Admission Type: Involuntary    Reason for admission:         Estimated Length of Stay Update:  11/06/22  Estimated Discharge Date Update: 3-5 days    Patient Strengths/Barriers  Strengths (Must Choose Two): Other (comment) Cristal El)  Barriers: Other (comment) (terrell)  Addictive Behavior:Addictive Behavior  In the Past 3 Months, Have You Felt or Has Someone Told You That You Have a Problem With  : Other (comment) Cristal El)  Medical Problems:  Past Medical History:   Diagnosis Date    Depression     Elevated BP     Genital warts     History of broken nose     Nicotine dependence        EDUCATION:   Learner Progress Toward Treatment Goals: None    Method: Individual    Outcome: None    PATIENT GOALS: Stabilize pt psychiatric symptoms. PLAN/TREATMENT RECOMMENDATIONS UPDATE:   What is the most important thing we can help you with while you are here? TERRELL  Who is your support system? Parents, sister,   Do you have follow-up providers? [facility] [address] you have the ability to pay for your medications? Medicare A&B, Medicaid secondary  Where will you be residing when you leave the hospital? Pt may temporarily be homeless until he has a guardian. Pt can return to the group home once he has a guardian. Will need a return to work slip or FMLA paper completion?  None      GOALS UPDATE:   Time frame for Short-Term Goals: Daily    RONALD Julien
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
## 2022-11-05 PROCEDURE — 1240000000 HC EMOTIONAL WELLNESS R&B

## 2022-11-05 PROCEDURE — 6370000000 HC RX 637 (ALT 250 FOR IP): Performed by: PSYCHIATRY & NEUROLOGY

## 2022-11-05 RX ADMIN — ACETAMINOPHEN 650 MG: 325 TABLET ORAL at 20:38

## 2022-11-05 RX ADMIN — LURASIDONE HYDROCHLORIDE 40 MG: 40 TABLET, FILM COATED ORAL at 08:48

## 2022-11-05 RX ADMIN — IBUPROFEN 400 MG: 400 TABLET, FILM COATED ORAL at 22:20

## 2022-11-05 ASSESSMENT — PAIN DESCRIPTION - DESCRIPTORS
DESCRIPTORS: ACHING;DISCOMFORT

## 2022-11-05 ASSESSMENT — PAIN - FUNCTIONAL ASSESSMENT
PAIN_FUNCTIONAL_ASSESSMENT: ACTIVITIES ARE NOT PREVENTED

## 2022-11-05 ASSESSMENT — PAIN DESCRIPTION - ONSET
ONSET: GRADUAL
ONSET: ON-GOING
ONSET: ON-GOING

## 2022-11-05 ASSESSMENT — PAIN DESCRIPTION - PAIN TYPE
TYPE: ACUTE PAIN

## 2022-11-05 ASSESSMENT — PAIN DESCRIPTION - LOCATION
LOCATION: HEAD;NECK
LOCATION: NECK
LOCATION: NECK

## 2022-11-05 ASSESSMENT — PAIN SCALES - GENERAL
PAINLEVEL_OUTOF10: 9
PAINLEVEL_OUTOF10: 0
PAINLEVEL_OUTOF10: 9
PAINLEVEL_OUTOF10: 9

## 2022-11-05 ASSESSMENT — PAIN DESCRIPTION - ORIENTATION
ORIENTATION: MID;RIGHT;LEFT
ORIENTATION: MID;RIGHT;LEFT
ORIENTATION: ANTERIOR;MID

## 2022-11-05 ASSESSMENT — PAIN DESCRIPTION - FREQUENCY
FREQUENCY: CONTINUOUS
FREQUENCY: CONTINUOUS
FREQUENCY: INTERMITTENT
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
## 2022-11-05 NOTE — PLAN OF CARE
Problem: Chronic Conditions and Co-morbidities  Goal: Patient's chronic conditions and co-morbidity symptoms are monitored and maintained or improved  11/5/2022 0039 by Deb Hughes RN  Outcome: Progressing  Note: Chronic conditions monitored. 11/4/2022 1146 by Bonilla Turner RN  Outcome: Progressing     Problem: Discharge Planning  Goal: Discharge to home or other facility with appropriate resources  11/5/2022 0039 by Deb Hughes RN  Outcome: Margarita Garcia (Taken 11/5/2022 0039)  Discharge to home or other facility with appropriate resources: Identify barriers to discharge with patient and caregiver  Note: Discharge planning is in process. 11/4/2022 1146 by Bonilla Turner RN  Outcome: Progressing  Note: Staff working on discharge planning     Problem: Psychosis  Goal: Will report no hallucinations or delusions  11/5/2022 0039 by Deb Hughes RN  Outcome: Progressing  Note: Patient denies hallucinations and does not exhibit delusional thoughts. 11/4/2022 1146 by Bonilla Turner RN  Outcome: Progressing  Note: Denies hallucinations when questioned     Problem: Behavior  Goal: Pt/Family maintain appropriate behavior and adhere to behavioral management agreement, if implemented  11/5/2022 0039 by Deb Hughes RN  Outcome: Progressing  Flowsheets (Taken 11/5/2022 1271)  Patient/family maintains appropriate behavior and adheres to behavioral management agreement, if implemented: Assess patient/familys coping skills and  non-compliant behavior (including use of illegal substances)  Note: Patient isolates to his room this shift. No acting out behaviors. 11/4/2022 1146 by Bonilla Turner RN  Outcome: Progressing     Problem: Involuntary Admit  Goal: Will cooperate with staff recommendations and doctor's orders and will demonstrate appropriate behavior  11/5/2022 0039 by Deb Hughes RN  Outcome: Progressing  Note: Patient behaviors have been appropriate this shift.   11/4/2022 1146 by Jalen Duran RN  Outcome: Progressing  Goal: Risk of elopement will decrease  11/5/2022 0039 by Brenda Knight RN  Outcome: Progressing  Note: Patient did not attempt to elope the unit this shift. 11/4/2022 1146 by Jalen Duran RN  Outcome: Progressing     Problem: Pain  Goal: Verbalizes/displays adequate comfort level or baseline comfort level  11/5/2022 0039 by Brenda Knight RN  Outcome: Progressing  Flowsheets (Taken 11/5/2022 0039)  Verbalizes/displays adequate comfort level or baseline comfort level: Assess pain using appropriate pain scale  Note: No complaints of pain this shift. 11/4/2022 1146 by Jalen Duran RN  Outcome: Progressing  Note: Denies pain when questioned     Problem: Coping  Goal: Pt/Family able to verbalize concerns and demonstrate effective coping strategies  11/5/2022 0039 by Brenda Knight RN  Outcome: Not Progressing  Flowsheets (Taken 11/5/2022 1155)  Patient/family able to verbalize anxieties, fears, and concerns, and demonstrate effective coping: Assist patient/family to identify coping skills, available support systems and cultural and spiritual values  Note: Patient does not identify coping skills this shift. 11/4/2022 1423 by Elvia Kirkland  Outcome: Not Progressing     Problem: Coping  Goal: Pt/Family able to verbalize concerns and demonstrate effective coping strategies  11/5/2022 0039 by Brenda Knight RN  Outcome: Not Progressing  Flowsheets (Taken 11/5/2022 6939)  Patient/family able to verbalize anxieties, fears, and concerns, and demonstrate effective coping: Assist patient/family to identify coping skills, available support systems and cultural and spiritual values  Note: Patient does not identify coping skills this shift.   11/4/2022 1423 by Elvia Kirkland  Outcome: Not Progressing

## 2022-11-05 NOTE — PROGRESS NOTES
Daily Progress Note  Meliton Moulton MD  11/5/2022    Reviewed patient's current plan of care and vital signs with nursing staff. Sleep:  8.5 hours last night  Attending groups: No  No reported Suicidal thought; he denies auditory or visual hallucinations; he is irritable and dismissive at times; No interaction with peers & staff, out for needs and meals. Mood was pretty good last night. SUBJECTIVE:    Patient is feeling better. SUICIDAL IDEATION denies suicidal ideation. Patient does not have medication side effects. ROS: Patient has new complaints:  No  Sleeping adequately:  Yes  Visitors: No    Mental Status Examination:  Patient is cooperative. Speech: Normal rate and tone. No abnormal movements, tics or mannerisms. Mood dysthymic; affect normal affect. Suicidal ideation Absent. Homicidal ideations Absent. Hallucinations Absent. Delusions Present -somatic believing he has skin parasites. Thought Content: delusions. Thought Processes: Evasive. Alert and oriented X 3. Attention and concentration Fair. MEMORY intact. Insight and Judgement impaired judgment. Data   height is 6' 4\" (1.93 m) and weight is 230 lb (104.3 kg). His oral temperature is 98.7 °F (37.1 °C). His blood pressure is 167/114 (abnormal) and his pulse is 76. His respiration is 16 and oxygen saturation is 95%.    Labs:   Admission on 11/03/2022, Discharged on 11/04/2022   Component Date Value Ref Range Status    WBC 11/03/2022 8.5  3.5 - 11.3 k/uL Final    RBC 11/03/2022 5.50  4.21 - 5.77 m/uL Final    Hemoglobin 11/03/2022 15.3  13.0 - 17.0 g/dL Final    Hematocrit 11/03/2022 47.3  40.7 - 50.3 % Final    MCV 11/03/2022 86.0  82.6 - 102.9 fL Final    MCH 11/03/2022 27.8  25.2 - 33.5 pg Final    MCHC 11/03/2022 32.3  25.2 - 33.5 g/dL Final    RDW 11/03/2022 13.8  11.8 - 14.4 % Final    Platelets 55/06/7518 203  138 - 453 k/uL Final    MPV 11/03/2022 11.1  8.1 - 13.5 fL Final    NRBC Automated 11/03/2022 0.0  0.0 per 100 WBC Final Seg Neutrophils 11/03/2022 66 (A)  36 - 65 % Final    Lymphocytes 11/03/2022 22 (A)  24 - 43 % Final    Monocytes 11/03/2022 9  3 - 12 % Final    Eosinophils % 11/03/2022 2  1 - 4 % Final    Basophils 11/03/2022 1  0 - 2 % Final    Immature Granulocytes 11/03/2022 0  0 % Final    Segs Absolute 11/03/2022 5.59  1.50 - 8.10 k/uL Final    Absolute Lymph # 11/03/2022 1.87  1.10 - 3.70 k/uL Final    Absolute Mono # 11/03/2022 0.75  0.10 - 1.20 k/uL Final    Absolute Eos # 11/03/2022 0.18  0.00 - 0.44 k/uL Final    Basophils Absolute 11/03/2022 0.05  0.00 - 0.20 k/uL Final    Absolute Immature Granulocyte 11/03/2022 0.03  0.00 - 0.30 k/uL Final    Glucose 11/03/2022 98  70 - 99 mg/dL Final    BUN 11/03/2022 18  6 - 20 mg/dL Final    Creatinine 11/03/2022 0.89  0.70 - 1.20 mg/dL Final    Est, Glom Filt Rate 11/03/2022 >60  >60 mL/min/1.73m2 Final    Comment:       Effective Oct 3, 2022        These results are not intended for use in patients <25years of age. eGFR results are calculated without a race factor using the 2021 CKD-EPI equation. Careful clinical correlation is recommended, particularly when comparing to results   calculated using previous equations. The CKD-EPI equation is less accurate in patients with extremes of muscle mass, extra-renal   metabolism of creatine, excessive creatine ingestion, or following therapy that affects   renal tubular secretion. Bun/Cre Ratio 11/03/2022 20  9 - 20 Final    Calcium 11/03/2022 9.4  8.6 - 10.4 mg/dL Final    Sodium 11/03/2022 140  135 - 144 mmol/L Final    Potassium 11/03/2022 4.6  3.7 - 5.3 mmol/L Final    Chloride 11/03/2022 103  98 - 107 mmol/L Final    CO2 11/03/2022 29  20 - 31 mmol/L Final    Anion Gap 11/03/2022 8 (A)  9 - 17 mmol/L Final    Ethanol 11/03/2022 <10  <10 mg/dL Final    Acetaminophen Level 11/03/2022 <5 (A)  10 - 30 ug/mL Final    Salicylate Lvl 86/58/0907 <1 (A)  3 - 10 mg/dL Final    Specimen Description 11/03/2022 . NASOPHARYNGEAL SWAB   Final    SARS-CoV-2, Rapid 11/03/2022 Not Detected  Not Detected Final    Comment:       Rapid NAAT:  The specimen is NEGATIVE for SARS-CoV-2, the novel coronavirus associated with   COVID-19. The ID NOW COVID-19 assay is designed to detect the virus that causes COVID-19 in patients   with signs and symptoms of infection who are suspected of COVID-19. An individual without symptoms of COVID-19 and who is not shedding SARS-CoV-2 virus would   expect to have a negative (not detected) result in this assay. Negative results should be treated as presumptive and, if inconsistent with clinical signs   and symptoms or necessary for patient management,  should be tested with an alternative molecular assay. Negative results do not preclude   SARS-CoV-2 infection and   should not be used as the sole basis for patient management decisions. Fact sheet for Healthcare Providers: http://www.osorio.anna/  Fact sheet for Patients: http://www.osorio.anna/        Methodology: Isothermal Nucleic Acid Amplification              Medications  Current Facility-Administered Medications: acetaminophen (TYLENOL) tablet 650 mg, 650 mg, Oral, Q4H PRN  ibuprofen (ADVIL;MOTRIN) tablet 400 mg, 400 mg, Oral, Q6H PRN  polyethylene glycol (GLYCOLAX) packet 17 g, 17 g, Oral, Daily PRN  hydrOXYzine HCl (ATARAX) tablet 50 mg, 50 mg, Oral, TID PRN  traZODone (DESYREL) tablet 50 mg, 50 mg, Oral, Nightly PRN  ziprasidone (GEODON) 10 mg in sterile water 0.5 mL injection, 10 mg, IntraMUSCular, TID PRN  [START ON 11/8/2022] ARIPiprazole ER (ABILIFY MAINTENA) injection 400 mg, 400 mg, IntraMUSCular, Q21 Days  lurasidone (LATUDA) tablet 40 mg, 40 mg, Oral, Daily    ASSESSMENT  Schizophrenia (HCC)     PLAN  Patient's symptoms are improving some  Continue with current medications. Attempt to develop insight  Psycho-education conducted. Supportive Therapy conducted.         Ellen Certification     Admission Day 1  I certify that this patient's inpatient psychiatric hospital admission is medically necessary for:      X (1) treatment which could reasonably be expected to improve this patient's condition, or    __ (2) diagnostic study or its equivalent.        Physicians Signature: Electronically signed by Renetta Cabrera MD on 11/5/2022 at 12:36 PM

## 2022-11-05 NOTE — PLAN OF CARE
Problem: Chronic Conditions and Co-morbidities  Goal: Patient's chronic conditions and co-morbidity symptoms are monitored and maintained or improved  Outcome: Progressing  Flowsheets (Taken 11/5/2022 0800)  Care Plan - Patient's Chronic Conditions and Co-Morbidity Symptoms are Monitored and Maintained or Improved: Monitor and assess patient's chronic conditions and comorbid symptoms for stability, deterioration, or improvement     Problem: Discharge Planning  Goal: Discharge to home or other facility with appropriate resources  Outcome: Progressing  Flowsheets (Taken 11/5/2022 0800)  Discharge to home or other facility with appropriate resources:   Identify barriers to discharge with patient and caregiver   Arrange for needed discharge resources and transportation as appropriate  Note: Patient voices he needs to find a place to go before discharge. Discharge planner working with patient to achieve optimal discharge plans, specific to individual needs. Problem: Psychosis  Goal: Will report no hallucinations or delusions  Description: INTERVENTIONS:  1. Administer medication as  ordered  2. Assist with reality testing to support increasing orientation  3. Assess if patient's hallucinations or delusions are encouraging self harm or harm to others and intervene as appropriate  Outcome: Progressing  Note: Patient denies hallucinations or delusions so far this shift. Problem: Behavior  Goal: Pt/Family maintain appropriate behavior and adhere to behavioral management agreement, if implemented  Description: INTERVENTIONS:  1. Assess patient/family's coping skills and  non-compliant behavior (including use of illegal substances)  2. Notify security of behavior or suspected illegal substances which indicate the need for search of the family and/or belongings  3. Encourage verbalization of thoughts and concerns in a socially appropriate manner  4.  Utilize positive, consistent limit setting strategies supporting safety of patient, staff and others  5. Encourage participation in the decision making process about the behavioral management agreement  6. If a visitor's behavior poses a threat to safety call refer to organization policy. 7. Initiate consult with , Psychosocial CNS, Spiritual Care as appropriate  Outcome: Progressing  Flowsheets (Taken 11/5/2022 0800)  Patient/family maintains appropriate behavior and adheres to behavioral management agreement, if implemented: Assess patient/familys coping skills and  non-compliant behavior (including use of illegal substances)  Note: Patient cooperative with assessment. Problem: Involuntary Admit  Goal: Will cooperate with staff recommendations and doctor's orders and will demonstrate appropriate behavior  Description: INTERVENTIONS:  1. Treat underlying conditions and offer medication as ordered  2. Educate regarding involuntary admission procedures and rules  3. Contain excessive/inappropriate behavior per unit and hospital policies  Outcome: Progressing  Flowsheets (Taken 11/5/2022 0800)  Will cooperate with staff recommendations and doctor's orders and will demonstrate appropriate behavior:   Treat underlying conditions and offer medication as ordered   Educate regarding involuntary admission procedures and rules     Problem: Pain  Goal: Verbalizes/displays adequate comfort level or baseline comfort level  Outcome: Progressing  Flowsheets (Taken 11/5/2022 0800)  Verbalizes/displays adequate comfort level or baseline comfort level: Assess pain using appropriate pain scale  Note: Patient denies pain so far this shift. Problem: Coping  Goal: Pt/Family able to verbalize concerns and demonstrate effective coping strategies  Description: INTERVENTIONS:  1. Assist patient/family to identify coping skills, available support systems and cultural and spiritual values  2.  Provide emotional support, including active listening and acknowledgement of concerns of patient and caregivers  3. Reduce environmental stimuli, as able  4. Instruct patient/family in relaxation techniques, as appropriate  5. Assess for spiritual pain/suffering and initiate Spiritual Care, Psychosocial Clinical Specialist consults as needed  Outcome: Progressing  Flowsheets (Taken 11/5/2022 0800)  Patient/family able to verbalize anxieties, fears, and concerns, and demonstrate effective coping: Assist patient/family to identify coping skills, available support systems and cultural and spiritual values     Care plan reviewed with patient. Patient verbalize understanding of the plan of care and contribute to goal setting.

## 2022-11-06 PROCEDURE — 1240000000 HC EMOTIONAL WELLNESS R&B

## 2022-11-06 PROCEDURE — 6370000000 HC RX 637 (ALT 250 FOR IP): Performed by: PSYCHIATRY & NEUROLOGY

## 2022-11-06 RX ORDER — BACITRACIN, NEOMYCIN, POLYMYXIN B 400; 3.5; 5 [USP'U]/G; MG/G; [USP'U]/G
OINTMENT TOPICAL 2 TIMES DAILY PRN
Status: DISCONTINUED | OUTPATIENT
Start: 2022-11-06 | End: 2022-11-11 | Stop reason: HOSPADM

## 2022-11-06 RX ADMIN — ACETAMINOPHEN 650 MG: 325 TABLET ORAL at 20:27

## 2022-11-06 RX ADMIN — ACETAMINOPHEN 650 MG: 325 TABLET ORAL at 08:01

## 2022-11-06 RX ADMIN — LURASIDONE HYDROCHLORIDE 80 MG: 40 TABLET, FILM COATED ORAL at 17:09

## 2022-11-06 RX ADMIN — ACETAMINOPHEN 650 MG: 325 TABLET ORAL at 15:27

## 2022-11-06 ASSESSMENT — PAIN DESCRIPTION - ORIENTATION
ORIENTATION: ANTERIOR
ORIENTATION: ANTERIOR
ORIENTATION: POSTERIOR

## 2022-11-06 ASSESSMENT — PAIN SCALES - GENERAL
PAINLEVEL_OUTOF10: 4
PAINLEVEL_OUTOF10: 4
PAINLEVEL_OUTOF10: 0
PAINLEVEL_OUTOF10: 5
PAINLEVEL_OUTOF10: 0

## 2022-11-06 ASSESSMENT — PAIN DESCRIPTION - PAIN TYPE
TYPE: ACUTE PAIN

## 2022-11-06 ASSESSMENT — PAIN DESCRIPTION - FREQUENCY
FREQUENCY: INTERMITTENT

## 2022-11-06 ASSESSMENT — PAIN - FUNCTIONAL ASSESSMENT
PAIN_FUNCTIONAL_ASSESSMENT: ACTIVITIES ARE NOT PREVENTED

## 2022-11-06 ASSESSMENT — PAIN DESCRIPTION - LOCATION
LOCATION: HEAD

## 2022-11-06 ASSESSMENT — PAIN DESCRIPTION - ONSET
ONSET: ON-GOING
ONSET: GRADUAL
ONSET: ON-GOING

## 2022-11-06 ASSESSMENT — PAIN DESCRIPTION - DESCRIPTORS
DESCRIPTORS: ACHING

## 2022-11-06 NOTE — PROGRESS NOTES
Daily Progress Note  Pili Walker MD  11/6/2022    Reviewed patient's current plan of care and vital signs with nursing staff. Sleep:  9.5 hours last night  Attending groups: No  No reported Suicidal thought; he denies auditory or visual hallucinations but he appears to be responding to internal stimuli at times by laughing inappropriately; he is less irritable but he makes weird comments to nursing staff; No interaction with peers & staff. Mood is pretty good last night. He feels anxious but does not want to take hydroxyzine. He told nursing staff he feels weird yesterday after taking Jeanann Meo; he has a history of noncompliance on psychotropics. He refused Latuda this morning and wanted to talk to this provider about it. SUBJECTIVE:    Patient is feeling better. SUICIDAL IDEATION denies suicidal ideation. Patient does not have medication side effects. ROS: Patient has new complaints:  No  Sleeping adequately:  Yes  Visitors: No    Mental Status Examination:  Patient is cooperative. Speech: Normal rate and tone. No abnormal movements, tics or mannerisms. Mood dysthymic; affect normal affect. Suicidal ideation Absent. Homicidal ideations Absent. Hallucinations Absent. Delusions Present -somatic believing he has skin parasites. Thought Content: delusions. Thought Processes: Loosening of association at times. Alert and oriented X 3. Attention and concentration Fair. MEMORY intact. Insight and Judgement impaired judgment. Data   height is 6' 4\" (1.93 m) and weight is 230 lb (104.3 kg). His oral temperature is 98.1 °F (36.7 °C). His blood pressure is 141/118 (abnormal) and his pulse is 76. His respiration is 16 and oxygen saturation is 94%.    Labs:            Medications  Current Facility-Administered Medications: acetaminophen (TYLENOL) tablet 650 mg, 650 mg, Oral, Q4H PRN  ibuprofen (ADVIL;MOTRIN) tablet 400 mg, 400 mg, Oral, Q6H PRN  polyethylene glycol (GLYCOLAX) packet 17 g, 17 g, Oral, Daily PRN  hydrOXYzine HCl (ATARAX) tablet 50 mg, 50 mg, Oral, TID PRN  traZODone (DESYREL) tablet 50 mg, 50 mg, Oral, Nightly PRN  ziprasidone (GEODON) 10 mg in sterile water 0.5 mL injection, 10 mg, IntraMUSCular, TID PRN  [START ON 11/8/2022] ARIPiprazole ER (ABILIFY MAINTENA) injection 400 mg, 400 mg, IntraMUSCular, Q21 Days  lurasidone (LATUDA) tablet 40 mg, 40 mg, Oral, Daily    ASSESSMENT  Schizophrenia (HCC)     PLAN  Patient's symptoms are improving some  Continue with current medications, increase Latuda. Attempt to develop insight  Psycho-education conducted. Supportive Therapy conducted.

## 2022-11-06 NOTE — PROGRESS NOTES
Group Therapy Note    Date: 11/5/2022  Start Time: 2000  End Time:  2020      Type of Group: Wrap-Up      Patient's Goal:  Patient set a goal \" to get out of here. Notes:  Patient's behaviors have improved a bit since admission. Patient is less irritable and/or dismissive. Status After Intervention:  Improved    Participation Level:  Active Listener and Interactive    Participation Quality: Appropriate and Attentive      Speech:  normal      Thought Process/Content: Linear      Affective Functioning: Blunted      Mood: euthymic      Level of consciousness:  Alert      Response to Learning: Able to retain information      Endings: None Reported    Modes of Intervention: Support      Discipline Responsible: Registered Nurse      Signature:  Zayra Carr RN

## 2022-11-06 NOTE — PLAN OF CARE
Problem: Chronic Conditions and Co-morbidities  Goal: Patient's chronic conditions and co-morbidity symptoms are monitored and maintained or improved  11/6/2022 1047 by Kellie Haji RN  Outcome: Progressing  Flowsheets (Taken 11/6/2022 0801)  Care Plan - Patient's Chronic Conditions and Co-Morbidity Symptoms are Monitored and Maintained or Improved: Monitor and assess patient's chronic conditions and comorbid symptoms for stability, deterioration, or improvement     Problem: Discharge Planning  Goal: Discharge to home or other facility with appropriate resources  11/6/2022 1047 by Kellie Haji RN  Outcome: Progressing  Flowsheets (Taken 11/6/2022 0801)  Discharge to home or other facility with appropriate resources: Identify barriers to discharge with patient and caregiver  Note: Patient voices he need to find a place to go before discharge. Patient states \"I got a $1000 dollars to help with housing\". Discharge planner working with patient to achieve optimal discharge plans, specific to individual needs. Problem: Psychosis  Goal: Will report no hallucinations or delusions  Description: INTERVENTIONS:  1. Administer medication as  ordered  2. Assist with reality testing to support increasing orientation  3. Assess if patient's hallucinations or delusions are encouraging self harm or harm to others and intervene as appropriate  11/6/2022 1047 by Kellie Haji RN  Outcome: Progressing  Note: Patient denies hallucinations, appears to responding to internal stimuli at times, laughs inappropriate at times, makes random hard to understand comments then will walk away from nurses station. Problem: Behavior  Goal: Pt/Family maintain appropriate behavior and adhere to behavioral management agreement, if implemented  Description: INTERVENTIONS:  1. Assess patient/family's coping skills and  non-compliant behavior (including use of illegal substances)  2.  Notify security of behavior or suspected illegal substances which indicate the need for search of the family and/or belongings  3. Encourage verbalization of thoughts and concerns in a socially appropriate manner  4. Utilize positive, consistent limit setting strategies supporting safety of patient, staff and others  5. Encourage participation in the decision making process about the behavioral management agreement  6. If a visitor's behavior poses a threat to safety call refer to organization policy. 7. Initiate consult with , Psychosocial CNS, Spiritual Care as appropriate  11/6/2022 1047 by Trevor Ratliff RN  Outcome: Progressing  Flowsheets (Taken 11/6/2022 0801)  Patient/family maintains appropriate behavior and adheres to behavioral management agreement, if implemented: Assess patient/familys coping skills and  non-compliant behavior (including use of illegal substances)  Note: Patient cooperative with staff, refused to take [place] this am.  Patient reports \"it made my head feel funny and my legs did not feel right\". Encouraged patient to discuss this with Dr. Jacqueline Ren, patient verbalized understanding. Problem: Involuntary Admit  Goal: Will cooperate with staff recommendations and doctor's orders and will demonstrate appropriate behavior  Description: INTERVENTIONS:  1. Treat underlying conditions and offer medication as ordered  2. Educate regarding involuntary admission procedures and rules  3. Contain excessive/inappropriate behavior per unit and hospital policies  [id number] by Trevor Ratliff RN  Outcome: Progressing  Flowsheets (Taken 11/5/2022 0800)  Will cooperate with staff recommendations and doctor's orders and will demonstrate appropriate behavior:   Treat underlying conditions and offer medication as ordered   Educate regarding involuntary admission procedures and rules  Note: Patient cooperative with staff.      Problem: Involuntary Admit  Goal: Risk of elopement will decrease  Description: Risk of elopement will decrease  11/6/2022 1047 by Isael Casanova RN  Outcome: Progressing  Note: Patient has not attempted to elope so far this shift. Problem: Pain  Goal: Verbalizes/displays adequate comfort level or baseline comfort level  11/6/2022 1047 by Isael Casanova RN  Flowsheets (Taken 11/5/2022 2211 by Chelsie Ribeiro RN)  Verbalizes/displays adequate comfort level or baseline comfort level: Assess pain using appropriate pain scale  Note: Pain Assessment: 0-10  Pain Level: 4   Patient's Stated Pain Goal: 0 - No pain   Is pain goal met at this time? Yes   Patient reports headache, taking Tylenol as needed, reports decrease pain. Non-Pharmaceutical Pain Intervention(s): Declines       Problem: Coping  Goal: Pt/Family able to verbalize concerns and demonstrate effective coping strategies  Description: INTERVENTIONS:  1. Assist patient/family to identify coping skills, available support systems and cultural and spiritual values  2. Provide emotional support, including active listening and acknowledgement of concerns of patient and caregivers  3. Reduce environmental stimuli, as able  4. Instruct patient/family in relaxation techniques, as appropriate  5. Assess for spiritual pain/suffering and initiate Spiritual Care, Psychosocial Clinical Specialist consults as needed  11/6/2022 1047 by Isael Casanova RN  Outcome: Progressing  Flowsheets (Taken 11/6/2022 0801)  Patient/family able to verbalize anxieties, fears, and concerns, and demonstrate effective coping: Assist patient/family to identify coping skills, available support systems and cultural and spiritual values     Care plan reviewed with patient. Patient verbalize understanding of the plan of care and contribute to goal setting.

## 2022-11-06 NOTE — PROGRESS NOTES
Patient did not go to groups this day. Out on the unit for a short time and then isolates to his room.

## 2022-11-06 NOTE — PROGRESS NOTES
[address] [place] [address]  Day 3 Interdisciplinary Treatment Plan NOTE    Review Date & Time: 11/06/22  1106    Patient was in treatment team    Admission Type:   Admission Type: Involuntary    Reason for admission:     Estimated Length of Stay Update:  11/10/22  Estimated Discharge Date Update: 2-4 days    Patient Strengths/Barriers  Strengths (Must Choose Two): Other (comment) Tawanda Cordero)  Barriers: Other (comment) (terrell)  Addictive Behavior:Addictive Behavior  In the Past 3 Months, Have You Felt or Has Someone Told You That You Have a Problem With  : Other (comment) Tawanda Cordero)  Medical Problems:  Past Medical History:   Diagnosis Date    Depression     Elevated BP     Genital warts     History of broken nose     Nicotine dependence        Risk:  Fall Risk   Jose Scale Jose Scale Score: 21    Status EXAM:   Mental Status and Behavioral Exam  Normal: No  Level of Assistance: Independent/Self  Facial Expression: Flat  Affect: Blunt  Level of Consciousness: Alert  Frequency of Checks: 4 times per hour, close  Mood:Normal: No  Mood: Other (comment) (rates \"pretty good\")  Motor Activity:Normal: Yes  Motor Activity: Decreased  Eye Contact: Fair  Observed Behavior: Cooperative  Sexual Misconduct History: Current - no  Preception: [place] to person, [place] to time, [place] to place  Attention:Normal: Yes  Attention: Distractible  Thought Processes: Circumstantial  Thought Content:Normal: No  Thought Content: Preoccupations  Depression Symptoms: No problems reported or observed. Anxiety Symptoms: Generalized  Ericka Symptoms: No problems reported or observed.   Hallucinations: None  Delusions: No  Memory:Normal: No  Memory: Poor recent  Insight and Judgment: No  Insight and Judgment: Poor judgment, Poor insight    Daily Assessment Last Entry:   Daily Sleep (WDL): Within Defined Limits            Daily Nutrition (WDL): Within Defined Limits  Level of Assistance: Independent/Self    Patient Monitoring:  Frequency of Checks: 4 times per hour, close    Psychiatric Symptoms:   Depression Symptoms  Depression Symptoms: No problems reported or observed. Anxiety Symptoms  Anxiety Symptoms: Generalized  Ericka Symptoms  Ericka Symptoms: No problems reported or observed. Suicide Risk CSSR-S:  1) Within the past month, have you wished you were dead or wished you could go to sleep and not wake up? : No  2) Have you actually had any thoughts of killing yourself? : No  6) Have you ever done anything, started to do anything, or prepared to do anything to end your life?: No    EDUCATION:   Learner Progress Toward Treatment Goals: Reviewed results and recommendations of this team, Reviewed group plan and strategies, Reviewed signs, symptoms and risk of self harm and violent behavior, and Reviewed goals and plan of care    Method: Individual    Outcome: Verbalized understanding and Needs reinforcement    PATIENT GOALS: Stabilize pt psychiatric symptoms. PLAN/TREATMENT RECOMMENDATIONS UPDATE:  How are you progressing toward meeting your main treatment goal? Pt's speech remains rapid and disorganized. Pt is oriented to person and place. Pt shares that the auditory hallucinations are less intrusive. I have observed pt responding to internal stimuli on the unit, laughing and responding to his voices. 2.  Are there discharge barriers/lingering problems that need to be addressed? Gilberto Benavides is exploring housing options. 3.  Do you have the ability to pay for your medications? Medicare and medicaid      4. How is your group participation?   Pt is not attending    GOALS UPDATE:   Time frame for Short-Term Goals: Daily      RONALD Kelly

## 2022-11-06 NOTE — PLAN OF CARE
Problem: Chronic Conditions and Co-morbidities  Goal: Patient's chronic conditions and co-morbidity symptoms are monitored and maintained or improved  11/5/2022 2211 by Jak Jama RN  Outcome: Progressing  Note: Patient's blood pressure is being monitored. 11/5/2022 1514 by Orestes Suazo RN  Outcome: Progressing  Flowsheets (Taken 11/5/2022 0800)  Care Plan - Patient's Chronic Conditions and Co-Morbidity Symptoms are Monitored and Maintained or Improved: Monitor and assess patient's chronic conditions and comorbid symptoms for stability, deterioration, or improvement     Problem: Discharge Planning  Goal: Discharge to home or other facility with appropriate resources  11/5/2022 2211 by Jak Jama RN  Outcome: Xi [place] (Taken 11/5/2022 2211)  Discharge to home or other facility with appropriate resources: Identify barriers to discharge with patient and caregiver  Note: Discharge planning is in process. 11/5/2022 1514 by Orestes Suazo RN  Outcome: Progressing  Flowsheets (Taken 11/5/2022 0800)  Discharge to home or other facility with appropriate resources:   Identify barriers to discharge with patient and caregiver   Arrange for needed discharge resources and transportation as appropriate  Note: Patient voices he needs to find a place to go before discharge. Discharge planner working with patient to achieve optimal discharge plans, specific to individual needs. Problem: Psychosis  Goal: Will report no hallucinations or delusions  11/5/2022 2211 by Jak Jama RN  Outcome: Progressing  Note: Patient denies hallucinations and delusional thoughts. 11/5/2022 1514 by Orestes Suazo RN  Outcome: Progressing  Note: Patient denies hallucinations or delusions so far this shift.      Problem: Behavior  Goal: Pt/Family maintain appropriate behavior and adhere to behavioral management agreement, if implemented  11/5/2022 2211 by Jak Jama RN  Outcome: Progressing  Flowsheets (Taken 11/5/2022 2211)  Patient/family maintains appropriate behavior and adheres to behavioral management agreement, if implemented: Assess patient/familys coping skills and  non-compliant behavior (including use of illegal substances)  Note: Patient is out on the unit at times although  prefers to isolate to her room. 11/5/2022 1514 by Charley Falcon RN  Outcome: Progressing  Flowsheets (Taken 11/5/2022 0800)  Patient/family maintains appropriate behavior and adheres to behavioral management agreement, if implemented: Assess patient/familys coping skills and  non-compliant behavior (including use of illegal substances)  Note: Patient cooperative with assessment. Problem: Involuntary Admit  Goal: Will cooperate with staff recommendations and doctor's orders and will demonstrate appropriate behavior  11/5/2022 2211 by Jazzy Samuels RN  Outcome: Progressing  Note: Patient's behaviors have been appropriate this shift. 11/5/2022 1514 by Charley Falcon RN  Outcome: Progressing  Flowsheets (Taken 11/5/2022 0800)  Will cooperate with staff recommendations and doctor's orders and will demonstrate appropriate behavior:   Treat underlying conditions and offer medication as ordered   Educate regarding involuntary admission procedures and rules  Goal: Risk of elopement will decrease  11/5/2022 2211 by Jazzy Samuels RN  Note: Patient has not attempted to elope the unit. 11/5/2022 1514 by Charley Falcon RN  Outcome: Progressing  Note: Patient has not been trying to elope so far this shift. Problem: Pain  Goal: Verbalizes/displays adequate comfort level or baseline comfort level  11/5/2022 2211 by Jazzy Samuels RN  Outcome: Progressing  Flowsheets (Taken 11/5/2022 2211)  Verbalizes/displays adequate comfort level or baseline comfort level: Assess pain using appropriate pain scale  Note: Patient reports head and neck pain of a # 9.  Patient given prn Tylenol 650 mg.'s with no relief obtained. Later the patient was given prn Motrin 400 mg.'s hopefully to offer him some relieft. 11/5/2022 1514 by Trevor Ratliff RN  Outcome: Progressing  Flowsheets (Taken 11/5/2022 0800)  Verbalizes/displays adequate comfort level or baseline comfort level: Assess pain using appropriate pain scale  Note: Patient denies pain so far this shift. Problem: Coping  Goal: Pt/Family able to verbalize concerns and demonstrate effective coping strategies  11/5/2022 2211 by Keisha Sears RN  Outcome: Progressing  Flowsheets (Taken 11/5/2022 2211)  Patient/family able to verbalize anxieties, fears, and concerns, and demonstrate effective coping: Assist patient/family to identify coping skills, available support systems and cultural and spiritual values  Note: Patient reports that he has no coping skills at this time.   11/5/2022 1514 by Trevor Ratliff RN  Outcome: Progressing  Flowsheets (Taken 11/5/2022 0800)  Patient/family able to verbalize anxieties, fears, and concerns, and demonstrate effective coping: Assist patient/family to identify coping skills, available support systems and cultural and spiritual values

## 2022-11-07 PROCEDURE — 6370000000 HC RX 637 (ALT 250 FOR IP): Performed by: PSYCHIATRY & NEUROLOGY

## 2022-11-07 PROCEDURE — 2500000003 HC RX 250 WO HCPCS: Performed by: PSYCHIATRY & NEUROLOGY

## 2022-11-07 PROCEDURE — 1240000000 HC EMOTIONAL WELLNESS R&B

## 2022-11-07 RX ADMIN — PHENYLEPHRINE HYDROCHLORIDE 1 SPRAY: 0.5 SPRAY NASAL at 06:56

## 2022-11-07 RX ADMIN — PHENYLEPHRINE HYDROCHLORIDE 1 SPRAY: 0.5 SPRAY NASAL at 16:37

## 2022-11-07 RX ADMIN — LURASIDONE HYDROCHLORIDE 80 MG: 40 TABLET, FILM COATED ORAL at 08:13

## 2022-11-07 ASSESSMENT — PAIN SCALES - GENERAL
PAINLEVEL_OUTOF10: 0
PAINLEVEL_OUTOF10: 0

## 2022-11-07 NOTE — PROGRESS NOTES
Daily Progress Note  Kamilah Barroso MD  11/7/2022    Reviewed patient's current plan of care and vital signs with nursing staff. Sleep:  7 hours last night broken  Attending groups: No  No reported Suicidal thought; he denies auditory or visual hallucinations but he appears to be responding to internal stimuli at times by laughing inappropriately; No interaction with peers & staff. He is asking for discharge. I had a long discussion this morning with a staff from [facility] behavioral health looking for housing upon discharge. He is somewhat upset when we discussed about group home placement. SUBJECTIVE:    Patient is feeling better. SUICIDAL IDEATION denies suicidal ideation. Patient does not have medication side effects. ROS: Patient has new complaints:  No  Sleeping adequately:  Yes  Visitors: No    Mental Status Examination:  Patient is cooperative. Speech: Normal rate and tone. No abnormal movements, tics or mannerisms. Mood irritable; affect normal affect. Suicidal ideation Absent. Homicidal ideations Absent. Hallucinations Absent. Delusions less prominent. Thought Content: delusions. Thought Processes: Loosening of association at times. Alert and oriented X 3. Attention and concentration Fair. MEMORY intact. Insight and Judgement impaired judgment. Data   height is 6' 4\" (1.93 m) and weight is 230 lb (104.3 kg). His oral temperature is 97.9 °F (36.6 °C). His blood pressure is 165/116 (abnormal) and his pulse is 85. His respiration is 16 and oxygen saturation is 99%.    Labs:            Medications  Current Facility-Administered Medications: phenylephrine (LUCERO-SYNEPHRINE) 0.5 % nasal spray 1 spray, 1 spray, Each Nostril, Q6H PRN  lurasidone (LATUDA) tablet 80 mg, 80 mg, Oral, Daily  neomycin-bacitracin-polymyxin (NEOSPORIN) ointment, , Topical, BID PRN  acetaminophen (TYLENOL) tablet 650 mg, 650 mg, Oral, Q4H PRN  ibuprofen (ADVIL;MOTRIN) tablet 400 mg, 400 mg, Oral, Q6H PRN  polyethylene

## 2022-11-07 NOTE — PROGRESS NOTES
Type and Reason for Visit:    Initial, Positive Nutrition Screen (\"unable to assess\"positive nursing nutrition screen on admit)     Nutrition Recommendations/Plan:   Consider MVI. Continue ONS: Ensure Enlive TID, patient voiced he likes and is drinking. Continue current diet. Nutrition Assessment:      Pt. at low nutrition risk per RD evaluation. Patient seen this morning, lying in bed, states \"he just wants to get out of here'\". Admitted with schizophrenia, hallucinations, ONS started initially as unsure how patient was eating prior to admit or would eat when admitted as nurses unable to complete nursing nutrition screen on admit. Meal intake good %. He states he had food available prior to admit and was eating well. Denied any weight changes. He has been drinking ONS so will continue to offer. Labs reviewed. Rx; abiligy, latuda. Malnutrition Assessment:  No Malnutrition      Wounds:    None     Current Nutrition Therapies:    ADULT DIET; Regular  ADULT ORAL NUTRITION SUPPLEMENT; Breakfast, Lunch, Dinner; Standard High Calorie/High Protein Oral Supplement     Anthropometric Measures:  Height:    6' 4\" (193 cm)  Current Body Weight:   230 lb (104.3 kg) (11/4/22  unsure if stated or actual)  Admission Body Weight:    230 lb (104.3 kg) (11/4/22 unsure if stated or actual)  Usual Body Weight:      (Per patient 220-230#. Per EMR-MD office visits: 1/25/22: 230# ? stated vs. actual, 2/28/22: 228#12.8oz, 5/25/22: 209#)  Ideal Body Weight:     202 lbs   BMI:   28  BMI Categories:    Overweight (BMI 25.0-29. 9)     Nutrition Diagnosis:   No nutrition diagnosis at this time        Nutrition Interventions:   Food and/or Nutrient Delivery:    Continue Current Diet, Continue Oral Nutrition Supplement  Nutrition Education/Counseling:    Education not indicated  Coordination of Nutrition Care:   Continue to monitor while inpatient     Nutrition Monitoring and Evaluation:   Will monitor nutritional needs during LOS.        Discharge Planning:    RD following    Christopher Matta RD, LD:    Contact Number: [phone]

## 2022-11-07 NOTE — BH NOTE
Patient was calm and cooperative and was alert to all, stating her was here for his schizophrenia and admitting to hearing voices. He did sign his consents for me. Patient is picking at himself and has sores on his upper arms, his back and the back of his head.

## 2022-11-07 NOTE — PLAN OF CARE
Problem: Psychosis  Goal: Will report no hallucinations or delusions  Description: INTERVENTIONS:  1. Administer medication as  ordered  2. Assist with reality testing to support increasing orientation  3. Assess if patient's hallucinations or delusions are encouraging self harm or harm to others and intervene as appropriate  11/6/2022 2232 by Mica Batista RN  Outcome: Not Progressing  Note: Patient continues to talk and interact with hallucinations, though he does not admit to them and is also paranoid  11/6/2022 1047 by Charley Falcon RN  Outcome: Progressing  Note: Patient denies hallucinations, appears to responding to internal stimuli at times, laughs inappropriate at times, makes random hard to understand comments then will walk away from nurses station. Problem: Pain  Goal: Verbalizes/displays adequate comfort level or baseline comfort level  11/6/2022 2232 by Mica Batista RN  Outcome: Not Progressing  Note: Patient reports he has 5/10 headache that he reports is from the psyche medication. Pt given PRN Tylenol that was effective   11/6/2022 1047 by Charley Falcon RN  Flowsheets (Taken 11/5/2022 2211 by Jazzy Sameuls RN)  Verbalizes/displays adequate comfort level or baseline comfort level: Assess pain using appropriate pain scale  Note: Pain Assessment: 0-10  Pain Level: 4   Patient's Stated Pain Goal: 0 - No pain   Is pain goal met at this time? Yes   Patient reports headache, taking Tylenol as needed, reports decrease pain.   Non-Pharmaceutical Pain Intervention(s): Declines       Problem: Chronic Conditions and Co-morbidities  Goal: Patient's chronic conditions and co-morbidity symptoms are monitored and maintained or improved  11/6/2022 2232 by Mica Batista RN  Outcome: Progressing  Flowsheets (Taken 11/6/2022 2232)  Care Plan - Patient's Chronic Conditions and Co-Morbidity Symptoms are Monitored and Maintained or Improved: Monitor and assess patient's chronic conditions and comorbid symptoms for stability, deterioration, or improvement  11/6/2022 1047 by Giovanna Gonzalez RN  Outcome: Progressing  Flowsheets (Taken 11/6/2022 0801)  Care Plan - Patient's Chronic Conditions and Co-Morbidity Symptoms are Monitored and Maintained or Improved: Monitor and assess patient's chronic conditions and comorbid symptoms for stability, deterioration, or improvement     Problem: Discharge Planning  Goal: Discharge to home or other facility with appropriate resources  11/6/2022 2232 by Jacob Little RN  Outcome: Progressing  Note: Patient reports his sister is sending him a bus ticket to [facility] to live with her and his mother and follow will be there  11/6/2022 1047 by Giovanna Gonzalez RN  Outcome: Progressing  Flowsheets (Taken 11/6/2022 0801)  Discharge to home or other facility with appropriate resources: Identify barriers to discharge with patient and caregiver  Note: Patient voices he need to find a place to go before discharge. Patient states \"I got a $1000 dollars to help with housing\". Discharge planner working with patient to achieve optimal discharge plans, specific to individual needs. Problem: Behavior  Goal: Pt/Family maintain appropriate behavior and adhere to behavioral management agreement, if implemented  Description: INTERVENTIONS:  1. Assess patient/family's coping skills and  non-compliant behavior (including use of illegal substances)  2. Notify security of behavior or suspected illegal substances which indicate the need for search of the family and/or belongings  3. Encourage verbalization of thoughts and concerns in a socially appropriate manner  4. Utilize positive, consistent limit setting strategies supporting safety of patient, staff and others  5. Encourage participation in the decision making process about the behavioral management agreement  6. If a visitor's behavior poses a threat to safety call refer to organization policy.   7. Initiate consult with , Psychosocial CNS, Spiritual Care as appropriate  11/6/2022 2232 by Ashley Harris RN  Outcome: Progressing  Note: Patient is calm and cooperative with assessment and signed consent  11/6/2022 1047 by Kellie Haji RN  Outcome: Progressing  Flowsheets (Taken 11/6/2022 0801)  Patient/family maintains appropriate behavior and adheres to behavioral management agreement, if implemented: Assess patient/familys coping skills and  non-compliant behavior (including use of illegal substances)  Note: Patient cooperative with staff, refused to take Jearl Inoue this am.  Patient reports \"it made my head feel funny and my legs did not feel right\". Encouraged patient to discuss this with Dr. Bonnie Kingsley, patient verbalized understanding. Problem: Involuntary Admit  Goal: Will cooperate with staff recommendations and doctor's orders and will demonstrate appropriate behavior  Description: INTERVENTIONS:  1. Treat underlying conditions and offer medication as ordered  2. Educate regarding involuntary admission procedures and rules  3. Contain excessive/inappropriate behavior per unit and hospital policies  22/9/2163 [id number] by Ahsley Harris RN  Outcome: Progressing  Note: Pt is taking medications, attending and participating in groups and care planning. Pt has good interaction with staff and peers   11/6/2022 1047 by Kellie Haji RN  Outcome: Progressing  Flowsheets (Taken 11/5/2022 0800)  Will cooperate with staff recommendations and doctor's orders and will demonstrate appropriate behavior:   Treat underlying conditions and offer medication as ordered   Educate regarding involuntary admission procedures and rules  Note: Patient cooperative with staff.   Goal: Risk of elopement will decrease  Description: Risk of elopement will decrease  11/6/2022 2232 by Ashley Harris RN  Outcome: Progressing  Note: Patient made no attempts to leave the unit  11/6/2022 1047 by Kellie Haji RN  Outcome: Progressing  Note: Patient has not attempted to elope so far this shift. Problem: Coping  Goal: Pt/Family able to verbalize concerns and demonstrate effective coping strategies  Description: INTERVENTIONS:  1. Assist patient/family to identify coping skills, available support systems and cultural and spiritual values  2. Provide emotional support, including active listening and acknowledgement of concerns of patient and caregivers  3. Reduce environmental stimuli, as able  4. Instruct patient/family in relaxation techniques, as appropriate  5. Assess for spiritual pain/suffering and initiate Spiritual Care, Psychosocial Clinical Specialist consults as needed  11/6/2022 2232 by Katie Pinon RN  Outcome: Progressing  Note: Patient reports sleeping and thinking positively is his coping skills  11/6/2022 1047 by Lenka Chance RN  Outcome: Progressing  Flowsheets (Taken 11/6/2022 0801)  Patient/family able to verbalize anxieties, fears, and concerns, and demonstrate effective coping: Assist patient/family to identify coping skills, available support systems and cultural and spiritual values     Problem: Psychosis  Goal: Will report no hallucinations or delusions  Description: INTERVENTIONS:  1. Administer medication as  ordered  2. Assist with reality testing to support increasing orientation  3. Assess if patient's hallucinations or delusions are encouraging self harm or harm to others and intervene as appropriate  11/6/2022 2232 by Katie Pinon RN  Outcome: Not Progressing  Note: Patient continues to talk and interact with hallucinations, though he does not admit to them and is also paranoid  11/6/2022 1047 by Lenka Chance RN  Outcome: Progressing  Note: Patient denies hallucinations, appears to responding to internal stimuli at times, laughs inappropriate at times, makes random hard to understand comments then will walk away from nurses station.      Problem: Pain  Goal: Verbalizes/displays adequate comfort level or baseline comfort level  11/6/2022 2232 by Chey Mayberry RN  Outcome: Not Progressing  Note: Patient reports he has 5/10 headache that he reports is from the psyche medication. Pt given PRN Tylenol that was effective   11/6/2022 1047 by Ibrahima Cali RN  Flowsheets (Taken 11/5/2022 2211 by Leslie Zurita RN)  Verbalizes/displays adequate comfort level or baseline comfort level: Assess pain using appropriate pain scale  Note: Pain Assessment: 0-10  Pain Level: 4   Patient's Stated Pain Goal: 0 - No pain   Is pain goal met at this time? Yes   Patient reports headache, taking Tylenol as needed, reports decrease pain. Non-Pharmaceutical Pain Intervention(s): Declines     Care plan reviewed with patient and verbalize understanding of the plan of care and contribute to goal setting.

## 2022-11-08 PROCEDURE — 1240000000 HC EMOTIONAL WELLNESS R&B

## 2022-11-08 PROCEDURE — 6370000000 HC RX 637 (ALT 250 FOR IP): Performed by: PSYCHIATRY & NEUROLOGY

## 2022-11-08 RX ORDER — NICOTINE 21 MG/24HR
1 PATCH, TRANSDERMAL 24 HOURS TRANSDERMAL DAILY
Status: DISCONTINUED | OUTPATIENT
Start: 2022-11-08 | End: 2022-11-11 | Stop reason: HOSPADM

## 2022-11-08 RX ADMIN — LURASIDONE HYDROCHLORIDE 80 MG: 40 TABLET, FILM COATED ORAL at 08:11

## 2022-11-08 NOTE — PLAN OF CARE
Problem: Chronic Conditions and Co-morbidities  Goal: Patient's chronic conditions and co-morbidity symptoms are monitored and maintained or improved  Outcome: Progressing  Flowsheets (Taken 11/6/2022 2232 by Ramila Javier RN)  Care Plan - Patient's Chronic Conditions and Co-Morbidity Symptoms are Monitored and Maintained or Improved: Monitor and assess patient's chronic conditions and comorbid symptoms for stability, deterioration, or improvement     Problem: Discharge Planning  Goal: Discharge to home or other facility with appropriate resources  Outcome: Progressing  Flowsheets (Taken 11/6/2022 0801 by Genevieve Aggarwal RN)  Discharge to home or other facility with appropriate resources: Identify barriers to discharge with patient and caregiver  Note: Discharge planning in process. Problem: Psychosis  Goal: Will report no hallucinations or delusions  Description: INTERVENTIONS:  1. Administer medication as  ordered  2. Assist with reality testing to support increasing orientation  3. Assess if patient's hallucinations or delusions are encouraging self harm or harm to others and intervene as appropriate  Outcome: Not Progressing  Note: Pt does deny hallucinations but can be seen responding to internal stimuli      Problem: Behavior  Goal: Pt/Family maintain appropriate behavior and adhere to behavioral management agreement, if implemented  Description: INTERVENTIONS:  1. Assess patient/family's coping skills and  non-compliant behavior (including use of illegal substances)  2. Notify security of behavior or suspected illegal substances which indicate the need for search of the family and/or belongings  3. Encourage verbalization of thoughts and concerns in a socially appropriate manner  4. Utilize positive, consistent limit setting strategies supporting safety of patient, staff and others  5. Encourage participation in the decision making process about the behavioral management agreement  6.  If a visitor's behavior poses a threat to safety call refer to organization policy. 7. Initiate consult with , Psychosocial CNS, Spiritual Care as appropriate  Outcome: Progressing  Flowsheets (Taken 11/6/2022 0801 by Gordon Cancer, RN)  Patient/family maintains appropriate behavior and adheres to behavioral management agreement, if implemented: Assess patient/familys coping skills and  non-compliant behavior (including use of illegal substances)  Note: Pt is calm and cooperative     Problem: Involuntary Admit  Goal: Will cooperate with staff recommendations and doctor's orders and will demonstrate appropriate behavior  Description: INTERVENTIONS:  1. Treat underlying conditions and offer medication as ordered  2. Educate regarding involuntary admission procedures and rules  3. Contain excessive/inappropriate behavior per unit and hospital policies  Outcome: Progressing  Flowsheets (Taken 11/5/2022 0800 by Gordon Cancer, RN)  Will cooperate with staff recommendations and doctor's orders and will demonstrate appropriate behavior:   Treat underlying conditions and offer medication as ordered   Educate regarding involuntary admission procedures and rules     Problem: Pain  Goal: Verbalizes/displays adequate comfort level or baseline comfort level  Outcome: Progressing  Flowsheets (Taken 11/5/2022 2211 by Geovani Harrison RN)  Verbalizes/displays adequate comfort level or baseline comfort level: Assess pain using appropriate pain scale     Problem: Coping  Goal: Pt/Family able to verbalize concerns and demonstrate effective coping strategies  Description: INTERVENTIONS:  1. Assist patient/family to identify coping skills, available support systems and cultural and spiritual values  2. Provide emotional support, including active listening and acknowledgement of concerns of patient and caregivers  3. Reduce environmental stimuli, as able  4. Instruct patient/family in relaxation techniques, as appropriate  5.  Assess for spiritual pain/suffering and initiate Spiritual Care, Psychosocial Clinical Specialist consults as needed  Outcome: Progressing  Flowsheets (Taken 11/6/2022 0801 by Ibrahima Cali RN)  Patient/family able to verbalize anxieties, fears, and concerns, and demonstrate effective coping: Assist patient/family to identify coping skills, available support systems and cultural and spiritual values     Care plan reviewed with patient and does verbalize understanding of the plan of care and contribute to goal setting.

## 2022-11-08 NOTE — PROGRESS NOTES
Case management [id number] left a message for pt's , Digna Miner [phone]. I requested a return telephone call to discuss the status of pt's guardianship as well as his housing options.

## 2022-11-08 NOTE — PROGRESS NOTES
Group Therapy Note    Date: 11/8/2022  Start Time: 1330  End Time:  1440  Number of Participants: 9    Type of Group: Psychotherapy      Notes:  Pt is present for group. Group members shared their personal; experience with mental illness and or addiction. Their shared feelings of hopelessness, feeling alone and thoughts of suicide prior to admission. The group members shared the benefit of sharing their experience with others who have experienced the same issues. The group also identified how this is helpful in challenging their perspective to one which is more supportive of recovery. Peers were introduced to basic CBT concepts. Status After Intervention:  Improved    Participation Level:  Active Listener and Interactive    Participation Quality: Appropriate, Attentive, Sharing, and Supportive      Speech:  normal      Thought Process/Content: Disorganized,Linear      Affective Functioning: Congruent      Mood: Euthymic      Level of consciousness:  Alert, Oriented x4, and Attentive      Response to Learning: Able to verbalize current knowledge/experience, Able to verbalize/acknowledge new learning, Able to retain information, Capable of insight, Able to change behavior, and Progressing to goal      Endings: None Reported    Modes of Intervention: Education, Support, Socialization, Exploration, Clarifying, and Problem-solving      Discipline Responsible: /Counselor      Signature:  RONALD Shanks

## 2022-11-08 NOTE — PROGRESS NOTES
Daily Progress Note  Tigist Nice MD  11/8/2022    Reviewed patient's current plan of care and vital signs with nursing staff. Sleep:  8.5 hours last night  Attending groups: No  No reported Suicidal thought; he denies auditory or visual hallucinations but he appears to be responding to internal stimuli last night; No interaction with peers & staff. He did receive Abilify [place] this morning. He took Katie Corey this morning with a lot of encouragement. He is more pleasant this morning even discussing possible discharge to the group home    SUBJECTIVE:    Patient is feeling better. SUICIDAL IDEATION denies suicidal ideation. Patient does not have medication side effects. ROS: Patient has new complaints:  No  Sleeping adequately:  Yes  Visitors: No    Mental Status Examination:  Patient is cooperative. Speech: Normal rate and tone. No abnormal movements, tics or mannerisms. Mood euthymic; affect normal affect. Suicidal ideation Absent. Homicidal ideations Absent. Hallucinations Absent. Delusions less prominent. Thought Content: Appropriate. Thought Processes: Goal directed. Alert and oriented X 3. Attention and concentration Fair. MEMORY intact. Insight and Judgement impaired judgment. Data   height is 6' 4\" (1.93 m) and weight is 230 lb (104.3 kg). His oral temperature is 96.3 °F (35.7 °C) (abnormal). His blood pressure is 169/118 (abnormal) and his pulse is 75. His respiration is 16 and oxygen saturation is 95%.    Labs:            Medications  Current Facility-Administered Medications: phenylephrine (LUCERO-SYNEPHRINE) 0.5 % nasal spray 1 spray, 1 spray, Each Nostril, Q6H PRN  ARIPiprazole ER (ABILIFY MAINTENA) 400 mg, 400 mg, IntraMUSCular, Q21 Days  lurasidone (LATUDA) tablet 80 mg, 80 mg, Oral, Daily  neomycin-bacitracin-polymyxin (NEOSPORIN) ointment, , Topical, BID PRN  acetaminophen (TYLENOL) tablet 650 mg, 650 mg, Oral, Q4H PRN  ibuprofen (ADVIL;MOTRIN) tablet 400 mg, 400 mg, Oral, Q6H PRN  polyethylene glycol (GLYCOLAX) packet 17 g, 17 g, Oral, Daily PRN  hydrOXYzine HCl (ATARAX) tablet 50 mg, 50 mg, Oral, TID PRN  traZODone (DESYREL) tablet 50 mg, 50 mg, Oral, Nightly PRN  ziprasidone (GEODON) 10 mg in sterile water 0.5 mL injection, 10 mg, IntraMUSCular, TID PRN    ASSESSMENT  Schizophrenia (HCC)     PLAN  Patient's symptoms are improving  Continue with current medications. Attempt to develop insight  Psycho-education conducted. Supportive Therapy conducted.

## 2022-11-08 NOTE — BH NOTE
Group Therapy Note    Date: 11/8/2022    Start Time:  1000  End Time:    1050    Number of Participants: 7    Type of Group: Recreational    Patient's Goal:   Increase stress management skills. Notes:  Patient participated in a My Stress Plan worksheet and shared his answers with others in the group.      Status After Intervention:  Improved    Participation Level: Interactive    Participation Quality: Appropriate, Attentive, and Sharing      Speech:  normal      Thought Process/Content: Logical      Affective Functioning: Congruent      Mood: euthymic      Level of consciousness:  Oriented x4      Response to Learning: Progressing to goal      Endings: None Reported    Modes of Intervention: Education, Support, Socialization, Exploration, Clarifying, Problem-solving, and Activity      Discipline Responsible: Psychoeducational Specialist      Signature:  Sisi Bolanos

## 2022-11-08 NOTE — PLAN OF CARE
Patient has attended at least 2 groups today and has also been out of his room to socialize with others this shift so he has been able to demonstrate effective coping strategies at this time.

## 2022-11-08 NOTE — PLAN OF CARE
Problem: Chronic Conditions and Co-morbidities  Goal: Patient's chronic conditions and co-morbidity symptoms are monitored and maintained or improved  11/8/2022 0959 by Nelsy Graves RN  Outcome: Progressing  Flowsheets (Taken 11/8/2022 0815)  Care Plan - Patient's Chronic Conditions and Co-Morbidity Symptoms are Monitored and Maintained or Improved: Monitor and assess patient's chronic conditions and comorbid symptoms for stability, deterioration, or improvement     Problem: Behavior  Goal: Pt/Family maintain appropriate behavior and adhere to behavioral management agreement, if implemented  Description: INTERVENTIONS:  1. Assess patient/family's coping skills and  non-compliant behavior (including use of illegal substances)  2. Notify security of behavior or suspected illegal substances which indicate the need for search of the family and/or belongings  3. Encourage verbalization of thoughts and concerns in a socially appropriate manner  4. Utilize positive, consistent limit setting strategies supporting safety of patient, staff and others  5. Encourage participation in the decision making process about the behavioral management agreement  6. If a visitor's behavior poses a threat to safety call refer to organization policy. 7. Initiate consult with , Psychosocial CNS, Spiritual Care as appropriate  11/8/2022 0959 by Nelsy Graves RN  Outcome: Progressing  Flowsheets (Taken 11/8/2022 0815)  Patient/family maintains appropriate behavior and adheres to behavioral management agreement, if implemented: Assess patient/familys coping skills and  non-compliant behavior (including use of illegal substances)  Note: Patient cooperative with staff this morning. Problem: Involuntary Admit  Goal: Will cooperate with staff recommendations and doctor's orders and will demonstrate appropriate behavior  Description: INTERVENTIONS:  1. Treat underlying conditions and offer medication as ordered  2. Educate regarding involuntary admission procedures and rules  3. Contain excessive/inappropriate behavior per unit and hospital policies  24/0/4202 [id number] by Samina Mancia RN  Outcome: Progressing  Flowsheets (Taken 11/8/2022 0815)  Will cooperate with staff recommendations and doctor's orders and will demonstrate appropriate behavior: Treat underlying conditions and offer medication as ordered  Note: Patient cooperative with Abilify injection. Patient took [place], states \"I will take it because I have to, but it causes heart burn, headache, and I don't feel the same\". Problem: Pain  Goal: Verbalizes/displays adequate comfort level or baseline comfort level  11/8/2022 0959 by Samina Mancia RN  Outcome: Progressing  Flowsheets (Taken 11/7/2022 2238 by Melia Bangura LPN)  Verbalizes/displays adequate comfort level or baseline comfort level: Assess pain using appropriate pain scale  Note: Patient denies pain so far this shift. Problem: Coping  Goal: Pt/Family able to verbalize concerns and demonstrate effective coping strategies  Description: INTERVENTIONS:  1. Assist patient/family to identify coping skills, available support systems and cultural and spiritual values  2. Provide emotional support, including active listening and acknowledgement of concerns of patient and caregivers  3. Reduce environmental stimuli, as able  4. Instruct patient/family in relaxation techniques, as appropriate  5. Assess for spiritual pain/suffering and initiate Spiritual Care, Psychosocial Clinical Specialist consults as needed  11/8/2022 0959 by Samina Mancia RN  Outcome: Progressing  Flowsheets (Taken 11/8/2022 0815)  Patient/family able to verbalize anxieties, fears, and concerns, and demonstrate effective coping: Assist patient/family to identify coping skills, available support systems and cultural and spiritual values  Note: Patient reports listening to music coping skills.   Patient is not attending therapeutic groups to gain insight on mental illness and learn positive coping skills. Problem: Psychosis  Goal: Will report no hallucinations or delusions  Description: INTERVENTIONS:  1. Administer medication as  ordered  2. Assist with reality testing to support increasing orientation  3. Assess if patient's hallucinations or delusions are encouraging self harm or harm to others and intervene as appropriate  11/8/2022 0959 by Gordon Peacock RN  Outcome: Progressing  Note: Patient denies hallucinations or delusions so far this shift. 11/7/2022 2217 by Cydney Lebron LPN  Outcome: Not Progressing  Note: Pt does deny hallucinations but can be seen responding to internal stimuli      Care plan reviewed with patient. Patient verbalize understanding of the plan of care and contribute to goal setting.

## 2022-11-08 NOTE — BH NOTE
This RN has reviewed and agrees with Pacheco Conroy LPN's data collection and has collaborated with this LPN regarding the patient's care plan.

## 2022-11-09 PROCEDURE — 1240000000 HC EMOTIONAL WELLNESS R&B

## 2022-11-09 PROCEDURE — 99231 SBSQ HOSP IP/OBS SF/LOW 25: CPT

## 2022-11-09 PROCEDURE — 6370000000 HC RX 637 (ALT 250 FOR IP): Performed by: PSYCHIATRY & NEUROLOGY

## 2022-11-09 RX ADMIN — PHENYLEPHRINE HYDROCHLORIDE 1 SPRAY: 0.5 SPRAY NASAL at 17:53

## 2022-11-09 RX ADMIN — LURASIDONE HYDROCHLORIDE 80 MG: 40 TABLET, FILM COATED ORAL at 07:47

## 2022-11-09 ASSESSMENT — PAIN SCALES - GENERAL: PAINLEVEL_OUTOF10: 0

## 2022-11-09 NOTE — PLAN OF CARE
Problem: Chronic Conditions and Co-morbidities  Goal: Patient's chronic conditions and co-morbidity symptoms are monitored and maintained or improved  11/9/2022 1156 by Kajal Duarte RN  Outcome: Progressing  Flowsheets (Taken 11/9/2022 0800)  Care Plan - Patient's Chronic Conditions and Co-Morbidity Symptoms are Monitored and Maintained or Improved: Monitor and assess patient's chronic conditions and comorbid symptoms for stability, deterioration, or improvement     Problem: Discharge Planning  Goal: Discharge to home or other facility with appropriate resources  11/9/2022 1156 by Kajal Duarte RN  Outcome: Progressing  Flowsheets (Taken 11/9/2022 0800)  Discharge to home or other facility with appropriate resources: Identify barriers to discharge with patient and caregiver  Note: Patient voices no needs before discharge. Patient plans to be discharged to group home tomorrow. Discharge planner working with patient to achieve optimal discharge plans, specific to individual needs. Problem: Psychosis  Goal: Will report no hallucinations or delusions  Description: INTERVENTIONS:  1. Administer medication as  ordered  2. Assist with reality testing to support increasing orientation  3. Assess if patient's hallucinations or delusions are encouraging self harm or harm to others and intervene as appropriate  11/9/2022 1156 by Kajal Duarte RN  Outcome: Progressing  Note: Patient denies hallucinations or delusions so far this shift. Problem: Behavior  Goal: Pt/Family maintain appropriate behavior and adhere to behavioral management agreement, if implemented  Description: INTERVENTIONS:  1. Assess patient/family's coping skills and  non-compliant behavior (including use of illegal substances)  2. Notify security of behavior or suspected illegal substances which indicate the need for search of the family and/or belongings  3.  Encourage verbalization of thoughts and concerns in a socially appropriate manner  4. Utilize positive, consistent limit setting strategies supporting safety of patient, staff and others  5. Encourage participation in the decision making process about the behavioral management agreement  6. If a visitor's behavior poses a threat to safety call refer to organization policy. 7. Initiate consult with , Psychosocial CNS, Spiritual Care as appropriate  11/9/2022 1156 by Josafat Lomeli RN  Outcome: Progressing  Flowsheets (Taken 11/9/2022 0800)  Patient/family maintains appropriate behavior and adheres to behavioral management agreement, if implemented: Assess patient/familys coping skills and  non-compliant behavior (including use of illegal substances)  Note: Patient appropriate with staff and taking medications. Problem: Involuntary Admit  Goal: Will cooperate with staff recommendations and doctor's orders and will demonstrate appropriate behavior  Description: INTERVENTIONS:  1. Treat underlying conditions and offer medication as ordered  2. Educate regarding involuntary admission procedures and rules  3. Contain excessive/inappropriate behavior per unit and hospital policies  10/8/1550 1036 by Josafat Lomeli RN  Outcome: Dex Ho (Taken 11/8/2022 0815)  Will cooperate with staff recommendations and doctor's orders and will demonstrate appropriate behavior: Treat underlying conditions and offer medication as ordered     Problem: Pain  Goal: Verbalizes/displays adequate comfort level or baseline comfort level  11/9/2022 1156 by Josafat Lomeli RN  Outcome: Progressing  Flowsheets (Taken 11/9/2022 0800)  Verbalizes/displays adequate comfort level or baseline comfort level: Encourage patient to monitor pain and request assistance  Note: Patient denies pain so far this shift. Problem: Coping  Goal: Pt/Family able to verbalize concerns and demonstrate effective coping strategies  Description: INTERVENTIONS:  1.  Assist patient/family to identify coping skills, available support systems and cultural and spiritual values  2. Provide emotional support, including active listening and acknowledgement of concerns of patient and caregivers  3. Reduce environmental stimuli, as able  4. Instruct patient/family in relaxation techniques, as appropriate  5. Assess for spiritual pain/suffering and initiate Spiritual Care, Psychosocial Clinical Specialist consults as needed  11/9/2022 1156 by Yolanda Lindsey RN  Outcome: Progressing  Flowsheets (Taken 11/9/2022 0800)  Patient/family able to verbalize anxieties, fears, and concerns, and demonstrate effective coping: Assist patient/family to identify coping skills, available support systems and cultural and spiritual values     Care plan reviewed with patient. Patient verbalize understanding of the plan of care and contribute to goal setting.

## 2022-11-09 NOTE — DISCHARGE INSTR - DIET

## 2022-11-09 NOTE — PROGRESS NOTES
Daily Progress Note  Vivian King MD  11/9/2022    Reviewed patient's current plan of care and vital signs with nursing staff. Sleep:  8.5 hours last night  Attending groups: No  No reported Suicidal thought; he denies hallucinations but he talked to himself at times; overall he is doing much better and thought process is more appropriate. No interaction with peers & staff. He does not like to take [place] but has been taking it without encouragement. SUBJECTIVE:    Patient is feeling better. SUICIDAL IDEATION denies suicidal ideation. Patient does not have medication side effects. ROS: Patient has new complaints:  No  Sleeping adequately:  Yes  Visitors: No    Mental Status Examination:  Patient is cooperative. Speech: Normal rate and tone. No abnormal movements, tics or mannerisms. Mood euthymic; affect normal affect. Suicidal ideation Absent. Homicidal ideations Absent. Hallucinations Absent. Delusions less prominent. Thought Content: Appropriate. Thought Processes: Goal directed. Alert and oriented X 3. Attention and concentration Fair. MEMORY intact. Insight and Judgement impaired judgment. Data   height is 6' 4\" (1.93 m) and weight is 230 lb (104.3 kg). His tympanic temperature is 99.7 °F (37.6 °C). His blood pressure is 191/119 (abnormal) and his pulse is 92. His respiration is 18 and oxygen saturation is 96%.    Labs:            Medications  Current Facility-Administered Medications: nicotine (NICODERM CQ) 21 MG/24HR 1 patch, 1 patch, TransDERmal, Daily  phenylephrine (LUCERO-SYNEPHRINE) 0.5 % nasal spray 1 spray, 1 spray, Each Nostril, Q6H PRN  ARIPiprazole ER (ABILIFY MAINTENA) 400 mg, 400 mg, IntraMUSCular, Q21 Days  lurasidone (LATUDA) tablet 80 mg, 80 mg, Oral, Daily  neomycin-bacitracin-polymyxin (NEOSPORIN) ointment, , Topical, BID PRN  acetaminophen (TYLENOL) tablet 650 mg, 650 mg, Oral, Q4H PRN  ibuprofen (ADVIL;MOTRIN) tablet 400 mg, 400 mg, Oral, Q6H PRN  polyethylene glycol (GLYCOLAX) packet 17 g, 17 g, Oral, Daily PRN  hydrOXYzine HCl (ATARAX) tablet 50 mg, 50 mg, Oral, TID PRN  traZODone (DESYREL) tablet 50 mg, 50 mg, Oral, Nightly PRN  ziprasidone (GEODON) 10 mg in sterile water 0.5 mL injection, 10 mg, IntraMUSCular, TID PRN    ASSESSMENT  Schizophrenia (HCC)     PLAN  Patient's symptoms are improving  Continue with current medications. Attempt to develop insight  Psycho-education conducted. Supportive Therapy conducted.   Probable discharge is tomorrow  Follow-up @ [facility]

## 2022-11-09 NOTE — BH NOTE
This RN has reviewed and agrees with Pan Sutton LPN's data collection and has collaborated with this LPN regarding the patient's care plan.

## 2022-11-09 NOTE — PROGRESS NOTES
Case Management 1530-I spoke with pt's  at [facility]. Arrangements have been made for pt to go to [facility] in [place]. [place] is securing an emergency Guardianship. [facility] will let Enrike Daugherty, that ot will be discharged tomorrow.

## 2022-11-09 NOTE — DISCHARGE INSTRUCTIONS
[employer] Hotline:  [phone]    Crisis phone numbers:  Reginaldo Jones, and [facility] [phone]. Tobi Ly, and [facility] [address] [facility] [phone]. [facility] [phone]. [address] [facility] Section [place], and [place] [phone]. [facility]  [address] [facility], [address]  [address]  [address] [facility]  [facility] [address]  [address]  [facility], [address]  [place], [address]  [address]arl[address]    [facility] ISIAH TAVARES  [address]  [address] [facility] 2210 [facility], [address] [facility]  [facility]  Recovery and [facility]  [address] , [facility]  [phone]    [facility]  [address] [facility],[address], [address]  [address] [facility] [address]  [place], [address]  [facility] the [facility]  [address]  [address], [address] [facility]  Recovery and [facility]  [address], [address]  [phone]    [facility] [place]  [address], [address]  [address]  [address] [place]   G[address]  677 [facility]  [address]  [place], [address]  Mag[address]  [address] 163   [facility], 3000 [facility]  [phone]

## 2022-11-09 NOTE — PROGRESS NOTES
Case Management 1453-I telephoned [id number] [facility] in [place]. I requested a return telephone call from the Director, Jason Beckman.

## 2022-11-09 NOTE — PLAN OF CARE
Problem: Chronic Conditions and Co-morbidities  Goal: Patient's chronic conditions and co-morbidity symptoms are monitored and maintained or improved  11/8/2022 2334 by Marisol Wade LPN  Outcome: Progressing  Flowsheets (Taken 11/8/2022 0815 by Yolanda Lindsey RN)  Care Plan - Patient's Chronic Conditions and Co-Morbidity Symptoms are Monitored and Maintained or Improved: Monitor and assess patient's chronic conditions and comorbid symptoms for stability, deterioration, or improvement  11/8/2022 0959 by Yolanda Lindsey RN  Outcome: Progressing  Flowsheets (Taken 11/8/2022 0815)  Care Plan - Patient's Chronic Conditions and Co-Morbidity Symptoms are Monitored and Maintained or Improved: Monitor and assess patient's chronic conditions and comorbid symptoms for stability, deterioration, or improvement     Problem: Discharge Planning  Goal: Discharge to home or other facility with appropriate resources  11/8/2022 2334 by Marisol Wade LPN  Outcome: Progressing  Flowsheets (Taken 11/8/2022 0815 by Yolanda Lindsey RN)  Discharge to home or other facility with appropriate resources:   Identify barriers to discharge with patient and caregiver   Arrange for needed discharge resources and transportation as appropriate  Note: Discharge planning in process, pt is working with  to discuss discharge options  11/8/2022 0959 by Yolanda Lindsey RN  Outcome: Progressing  Flowsheets (Taken 11/8/2022 0815)  Discharge to home or other facility with appropriate resources:   Identify barriers to discharge with patient and caregiver   Arrange for needed discharge resources and transportation as appropriate  Note: Patient voices he needs to find to go before discharge. Patient reports \"I am waiting on the \". Discharge planner working with patient to achieve optimal discharge plans, specific to individual needs.        Problem: Psychosis  Goal: Will report no hallucinations or delusions  Description: INTERVENTIONS:  1. Administer medication as  ordered  2. Assist with reality testing to support increasing orientation  3. Assess if patient's hallucinations or delusions are encouraging self harm or harm to others and intervene as appropriate  11/8/2022 2334 by Marilin Carolina LPN  Outcome: Progressing  Note: Denies hallucinations and delusions. 11/8/2022 0959 by Giovanna Gonzalez RN  Outcome: Progressing  Note: Patient denies hallucinations or delusions so far this shift. Problem: Behavior  Goal: Pt/Family maintain appropriate behavior and adhere to behavioral management agreement, if implemented  Description: INTERVENTIONS:  1. Assess patient/family's coping skills and  non-compliant behavior (including use of illegal substances)  2. Notify security of behavior or suspected illegal substances which indicate the need for search of the family and/or belongings  3. Encourage verbalization of thoughts and concerns in a socially appropriate manner  4. Utilize positive, consistent limit setting strategies supporting safety of patient, staff and others  5. Encourage participation in the decision making process about the behavioral management agreement  6. If a visitor's behavior poses a threat to safety call refer to organization policy. 7. Initiate consult with , Psychosocial CNS, Spiritual Care as appropriate  11/8/2022 2334 by Marilin Carolina LPN  Outcome: Progressing  Flowsheets (Taken 11/8/2022 0815 by Giovanna Gonzalez, GRAHAM)  Patient/family maintains appropriate behavior and adheres to behavioral management agreement, if implemented: Assess patient/familys coping skills and  non-compliant behavior (including use of illegal substances)  Note: Pt has remained cooperative with staff so far this shift.    11/8/2022 0959 by Giovanna Gonzalez RN  Outcome: Progressing  Flowsheets (Taken 11/8/2022 0815)  Patient/family maintains appropriate behavior and adheres to behavioral management agreement, if implemented: Assess patient/familys coping skills and  non-compliant behavior (including use of illegal substances)  Note: Patient cooperative with staff this morning. Problem: Involuntary Admit  Goal: Will cooperate with staff recommendations and doctor's orders and will demonstrate appropriate behavior  Description: INTERVENTIONS:  1. Treat underlying conditions and offer medication as ordered  2. Educate regarding involuntary admission procedures and rules  3. Contain excessive/inappropriate behavior per unit and hospital policies  72/9/8126 5714 by Nena Valencia LPN  Outcome: Progressing  11/8/2022 0959 by Charley Falcon RN  Outcome: Progressing  Flowsheets (Taken 11/8/2022 0815)  Will cooperate with staff recommendations and doctor's orders and will demonstrate appropriate behavior: Treat underlying conditions and offer medication as ordered  Note: Patient cooperative with Abilify injection. Patient took Raina Been, states \"I will take it because I have to, but it causes heart burn, headache, and I don't feel the same\". Goal: Risk of elopement will decrease  Description: Risk of elopement will decrease  11/8/2022 0959 by Cahrley Falcon RN  Outcome: Progressing     Problem: Pain  Goal: Verbalizes/displays adequate comfort level or baseline comfort level  11/8/2022 2334 by Nena Valencia LPN  Outcome: Progressing  Flowsheets (Taken 11/7/2022 2238)  Verbalizes/displays adequate comfort level or baseline comfort level: Assess pain using appropriate pain scale  Note: Pt denies pain at this time. 11/8/2022 0959 by Charley Falcon RN  Outcome: Progressing  Flowsheets (Taken 11/7/2022 2238 by Nena Valencia LPN)  Verbalizes/displays adequate comfort level or baseline comfort level: Assess pain using appropriate pain scale  Note: Patient denies pain so far this shift. Problem: Coping  Goal: Pt/Family able to verbalize concerns and demonstrate effective coping strategies  Description: INTERVENTIONS:  1. Assist patient/family to identify coping skills, available support systems and cultural and spiritual values  2. Provide emotional support, including active listening and acknowledgement of concerns of patient and caregivers  3. Reduce environmental stimuli, as able  4. Instruct patient/family in relaxation techniques, as appropriate  5. Assess for spiritual pain/suffering and initiate Spiritual Care, Psychosocial Clinical Specialist consults as needed  11/8/2022 2334 by Derrek Mittal LPN  Outcome: Progressing  Flowsheets (Taken 11/8/2022 0815 by Gabriela Flores RN)  Patient/family able to verbalize anxieties, fears, and concerns, and demonstrate effective coping: Assist patient/family to identify coping skills, available support systems and cultural and spiritual values  11/8/2022 1547 by Jude Young  Outcome: Progressing  11/8/2022 0959 by Gabriela Flores RN  Outcome: Progressing  Flowsheets (Taken 11/8/2022 0815)  Patient/family able to verbalize anxieties, fears, and concerns, and demonstrate effective coping: Assist patient/family to identify coping skills, available support systems and cultural and spiritual values  Note: Patient reports listening to music coping skills. Patient is not attending therapeutic groups to gain insight on mental illness and learn positive coping skills. Care plan reviewed with patient and does verbalize understanding of the plan of care and contribute to goal setting.

## 2022-11-10 LAB
ANION GAP SERPL CALCULATED.3IONS-SCNC: 8 MEQ/L (ref 8–16)
BUN BLDV-MCNC: 21 MG/DL (ref 7–22)
CALCIUM SERPL-MCNC: 9.4 MG/DL (ref 8.5–10.5)
CHLORIDE BLD-SCNC: 99 MEQ/L (ref 98–111)
CO2: 31 MEQ/L (ref 23–33)
CREAT SERPL-MCNC: 1 MG/DL (ref 0.4–1.2)
GFR SERPL CREATININE-BSD FRML MDRD: > 60 ML/MIN/1.73M2
GLUCOSE BLD-MCNC: 95 MG/DL (ref 70–108)
POTASSIUM SERPL-SCNC: 4.4 MEQ/L (ref 3.5–5.2)
POTASSIUM SERPL-SCNC: 5.3 MEQ/L (ref 3.5–5.2)
SODIUM BLD-SCNC: 138 MEQ/L (ref 135–145)

## 2022-11-10 PROCEDURE — 80048 BASIC METABOLIC PNL TOTAL CA: CPT

## 2022-11-10 PROCEDURE — 6370000000 HC RX 637 (ALT 250 FOR IP): Performed by: PSYCHIATRY & NEUROLOGY

## 2022-11-10 PROCEDURE — 84132 ASSAY OF SERUM POTASSIUM: CPT

## 2022-11-10 PROCEDURE — 6370000000 HC RX 637 (ALT 250 FOR IP): Performed by: PHYSICIAN ASSISTANT

## 2022-11-10 PROCEDURE — 1240000000 HC EMOTIONAL WELLNESS R&B

## 2022-11-10 PROCEDURE — 36415 COLL VENOUS BLD VENIPUNCTURE: CPT

## 2022-11-10 PROCEDURE — 6370000000 HC RX 637 (ALT 250 FOR IP)

## 2022-11-10 PROCEDURE — 99233 SBSQ HOSP IP/OBS HIGH 50: CPT | Performed by: PHYSICIAN ASSISTANT

## 2022-11-10 RX ORDER — CALCIUM CARBONATE 200(500)MG
500 TABLET,CHEWABLE ORAL 3 TIMES DAILY PRN
Status: DISCONTINUED | OUTPATIENT
Start: 2022-11-10 | End: 2022-11-11 | Stop reason: HOSPADM

## 2022-11-10 RX ORDER — HYDROCHLOROTHIAZIDE 12.5 MG/1
12.5 CAPSULE, GELATIN COATED ORAL DAILY
Status: DISCONTINUED | OUTPATIENT
Start: 2022-11-10 | End: 2022-11-11 | Stop reason: HOSPADM

## 2022-11-10 RX ORDER — LISINOPRIL 5 MG/1
5 TABLET ORAL DAILY
Status: DISCONTINUED | OUTPATIENT
Start: 2022-11-10 | End: 2022-11-11 | Stop reason: HOSPADM

## 2022-11-10 RX ORDER — MAGNESIUM HYDROXIDE/ALUMINUM HYDROXICE/SIMETHICONE 120; 1200; 1200 MG/30ML; MG/30ML; MG/30ML
30 SUSPENSION ORAL EVERY 6 HOURS PRN
Status: DISCONTINUED | OUTPATIENT
Start: 2022-11-10 | End: 2022-11-10

## 2022-11-10 RX ADMIN — LURASIDONE HYDROCHLORIDE 80 MG: 40 TABLET, FILM COATED ORAL at 08:20

## 2022-11-10 RX ADMIN — LISINOPRIL 5 MG: 5 TABLET ORAL at 08:58

## 2022-11-10 RX ADMIN — ANTACID TABLETS 500 MG: 500 TABLET, CHEWABLE ORAL at 16:39

## 2022-11-10 RX ADMIN — HYDROCHLOROTHIAZIDE 12.5 MG: 12.5 CAPSULE ORAL at 08:20

## 2022-11-10 ASSESSMENT — PAIN SCALES - GENERAL
PAINLEVEL_OUTOF10: 0
PAINLEVEL_OUTOF10: 0

## 2022-11-10 NOTE — PROGRESS NOTES
Case management 0904- Faxed clinical and demographic information to [facility] at [facility].

## 2022-11-10 NOTE — PROGRESS NOTES
Daily Progress Note  Hernan Luna MD  11/10/2022    Reviewed patient's current plan of care and vital signs with nursing staff. Sleep:  7.5 hours last night broken  Attending groups: No  No reported Suicidal thought; he denies hallucinations but he talks to himself at times. Limited interaction with peers & staff. Mood 7 on a scale of 1 to 10 with 10 is feeling normal.  I had a long conversation yesterday with patient's nurse at [facility]. The group home owner would like patient to have guardianship paper before admission there. Guardianship paper will not be completed until tomorrow. SUBJECTIVE:    Patient is feeling better. SUICIDAL IDEATION denies suicidal ideation. Patient does not have medication side effects. ROS: Patient has new complaints:  No  Sleeping adequately:  Yes  Visitors: No    Mental Status Examination:  Patient is cooperative. Speech: Normal rate and tone. No abnormal movements, tics or mannerisms. Mood euthymic; affect normal affect. Suicidal ideation Absent. Homicidal ideations Absent. Hallucinations Absent. Delusions less prominent. Thought Content: Appropriate. Thought Processes: Goal directed. Alert and oriented X 3. Attention and concentration Fair. MEMORY intact. Insight and Judgement impaired judgment. Data   height is 6' 4\" (1.93 m) and weight is 230 lb (104.3 kg). His tympanic temperature is 97.7 °F (36.5 °C). His blood pressure is 175/120 (abnormal) and his pulse is 94. His respiration is 16 and oxygen saturation is 95%.    Labs:            Medications  Current Facility-Administered Medications: hydroCHLOROthiazide (MICROZIDE) capsule 12.5 mg, 12.5 mg, Oral, Daily  nicotine (NICODERM CQ) 21 MG/24HR 1 patch, 1 patch, TransDERmal, Daily  phenylephrine (LUCERO-SYNEPHRINE) 0.5 % nasal spray 1 spray, 1 spray, Each Nostril, Q6H PRN  ARIPiprazole ER (ABILIFY MAINTENA) 400 mg, 400 mg, IntraMUSCular, Q21 Days  lurasidone (LATUDA) tablet 80 mg, 80 mg, Oral, Daily  neomycin-bacitracin-polymyxin (NEOSPORIN) ointment, , Topical, BID PRN  acetaminophen (TYLENOL) tablet 650 mg, 650 mg, Oral, Q4H PRN  ibuprofen (ADVIL;MOTRIN) tablet 400 mg, 400 mg, Oral, Q6H PRN  polyethylene glycol (GLYCOLAX) packet 17 g, 17 g, Oral, Daily PRN  hydrOXYzine HCl (ATARAX) tablet 50 mg, 50 mg, Oral, TID PRN  traZODone (DESYREL) tablet 50 mg, 50 mg, Oral, Nightly PRN  ziprasidone (GEODON) 10 mg in sterile water 0.5 mL injection, 10 mg, IntraMUSCular, TID PRN    ASSESSMENT  Schizophrenia (HCC)     PLAN  Patient's symptoms are improving  Continue with current medications. Attempt to develop insight  Psycho-education conducted. Supportive Therapy conducted. Hold discharge until tomorrow  Follow-up @ [facility]. Greater than 50% of unit time spent providing counseling &/or coordination of care.

## 2022-11-10 NOTE — PLAN OF CARE
Problem: Chronic Conditions and Co-morbidities  Goal: Patient's chronic conditions and co-morbidity symptoms are monitored and maintained or improved  11/10/2022 1150 by Santiago Ramos RN  Outcome: Progressing  11/9/2022 2212 by Emir Hardy RN  Outcome: Progressing  Flowsheets (Taken 11/9/2022 0800 by Raya Salmeron RN)  Care Plan - Patient's Chronic Conditions and Co-Morbidity Symptoms are Monitored and Maintained or Improved: Monitor and assess patient's chronic conditions and comorbid symptoms for stability, deterioration, or improvement  Note: Patient has been seen by a hospitalist for the patient's hypertension. Orders to follow. Problem: Discharge Planning  Goal: Discharge to home or other facility with appropriate resources  11/10/2022 1150 by Santiago Ramos RN  Outcome: Progressing  11/9/2022 2212 by Emir Hardy RN  Outcome: Progressing  Flowsheets (Taken 11/9/2022 2212)  Discharge to home or other facility with appropriate resources: Identify barriers to discharge with patient and caregiver  Note: Discharge planning is in process. Problem: Psychosis  Goal: Will report no hallucinations or delusions  Description: INTERVENTIONS:  1. Administer medication as  ordered  2. Assist with reality testing to support increasing orientation  3. Assess if patient's hallucinations or delusions are encouraging self harm or harm to others and intervene as appropriate  11/10/2022 1150 by Santiago Ramos RN  Outcome: Not Progressing  Note: Pt observed talking to self  11/9/2022 2212 by Emir Hardy RN  Outcome: Progressing  Note: Patient denies hallucinations this shift. None observed. No delusional thoughts observed. Problem: Behavior  Goal: Pt/Family maintain appropriate behavior and adhere to behavioral management agreement, if implemented  Description: INTERVENTIONS:  1. Assess patient/family's coping skills and  non-compliant behavior (including use of illegal substances)  2.  Notify security of behavior or suspected illegal substances which indicate the need for search of the family and/or belongings  3. Encourage verbalization of thoughts and concerns in a socially appropriate manner  4. Utilize positive, consistent limit setting strategies supporting safety of patient, staff and others  5. Encourage participation in the decision making process about the behavioral management agreement  6. If a visitor's behavior poses a threat to safety call refer to organization policy. 7. Initiate consult with , Psychosocial CNS, Spiritual Care as appropriate  11/10/2022 1150 by Santiago Ramos RN  Outcome: Progressing  Flowsheets (Taken 11/10/2022 1137)  Patient/family maintains appropriate behavior and adheres to behavioral management agreement, if implemented: Assess patient/familys coping skills and  non-compliant behavior (including use of illegal substances)  11/9/2022 2212 by Emir Hardy RN  Outcome: Progressing  Flowsheets (Taken 11/9/2022 2212)  Patient/family maintains appropriate behavior and adheres to behavioral management agreement, if implemented: Encourage verbalization of thoughts and concerns in a socially appropriate manner     Problem: Involuntary Admit  Goal: Will cooperate with staff recommendations and doctor's orders and will demonstrate appropriate behavior  Description: INTERVENTIONS:  1. Treat underlying conditions and offer medication as ordered  2. Educate regarding involuntary admission procedures and rules  3. Contain excessive/inappropriate behavior per unit and hospital policies  11/39/0012 4905 by Santiago Ramos RN  Outcome: Progressing  Flowsheets (Taken 11/10/2022 1137)  Will cooperate with staff recommendations and doctor's orders and will demonstrate appropriate behavior: Educate regarding involuntary admission procedures and rules  11/9/2022 2216 by Emir Hardy RN  Outcome: Progressing  Note: Patient has been cooperative.   Goal: Risk of elopement will decrease  Description: Risk of elopement will decrease  11/10/2022 1150 by Jag Bird RN  Outcome: Progressing  11/9/2022 2216 by Jose Alberto Parson RN  Outcome: Progressing  Note: No attempt to elope the unit this shift. Problem: Pain  Goal: Verbalizes/displays adequate comfort level or baseline comfort level  11/10/2022 1150 by Jag Bird RN  Outcome: Progressing  11/9/2022 2216 by Jose Alberto Parson RN  Outcome: Progressing  Flowsheets (Taken 11/9/2022 2216)  Verbalizes/displays adequate comfort level or baseline comfort level: Assess pain using appropriate pain scale  Note: No complaints of pain this shift. Problem: Coping  Goal: Pt/Family able to verbalize concerns and demonstrate effective coping strategies  Description: INTERVENTIONS:  1. Assist patient/family to identify coping skills, available support systems and cultural and spiritual values  2. Provide emotional support, including active listening and acknowledgement of concerns of patient and caregivers  3. Reduce environmental stimuli, as able  4. Instruct patient/family in relaxation techniques, as appropriate  5.  Assess for spiritual pain/suffering and initiate Spiritual Care, Psychosocial Clinical Specialist consults as needed  11/10/2022 1150 by Jag Bird RN  Outcome: Not Progressing  Flowsheets (Taken 11/10/2022 1137)  Patient/family able to verbalize anxieties, fears, and concerns, and demonstrate effective coping:   Provide emotional support, including active listening and acknowledgement of concerns of patient and caregivers   Reduce environmental stimuli, as able   Instruct patient/family in relaxation techniques, as appropriate  Note: Pt agitated when told he was not being discharged today, verbally aggressive  11/9/2022 2216 by Jose Alberto Parson RN  Outcome: Progressing  Flowsheets (Taken 11/9/2022 2216)  Patient/family able to verbalize anxieties, fears, and concerns, and demonstrate effective coping: Provide emotional support, including active listening and acknowledgement of concerns of patient and caregivers  Note: Patient reports that he miguel ángel by calling his dad and smoking. Problem: Psychosis  Goal: Will report no hallucinations or delusions  Description: INTERVENTIONS:  1. Administer medication as  ordered  2. Assist with reality testing to support increasing orientation  3. Assess if patient's hallucinations or delusions are encouraging self harm or harm to others and intervene as appropriate  11/10/2022 1150 by Kee Venegas RN  Outcome: Not Progressing  Note: Pt observed talking to self  11/9/2022 2212 by Cass Carr RN  Outcome: Progressing  Note: Patient denies hallucinations this shift. None observed. No delusional thoughts observed. Problem: Coping  Goal: Pt/Family able to verbalize concerns and demonstrate effective coping strategies  Description: INTERVENTIONS:  1. Assist patient/family to identify coping skills, available support systems and cultural and spiritual values  2. Provide emotional support, including active listening and acknowledgement of concerns of patient and caregivers  3. Reduce environmental stimuli, as able  4. Instruct patient/family in relaxation techniques, as appropriate  5.  Assess for spiritual pain/suffering and initiate Spiritual Care, Psychosocial Clinical Specialist consults as needed  11/10/2022 1150 by Kee Venegas RN  Outcome: Not Progressing  Flowsheets (Taken 11/10/2022 1137)  Patient/family able to verbalize anxieties, fears, and concerns, and demonstrate effective coping:   Provide emotional support, including active listening and acknowledgement of concerns of patient and caregivers   Reduce environmental stimuli, as able   Instruct patient/family in relaxation techniques, as appropriate  Note: Pt agitated when told he was not being discharged today, verbally aggressive  11/9/2022 2216 by Cass Carr RN  Outcome: Progressing  Flowsheets (Taken 11/9/2022 9332)  Patient/family able to verbalize anxieties, fears, and concerns, and demonstrate effective coping: Provide emotional support, including active listening and acknowledgement of concerns of patient and caregivers  Note: Patient reports that he miguel ángel by calling his dad and smoking. Care plan reviewed with patient.   Patient does not verbalize understanding of the plan of care and does not contribute to goal setting

## 2022-11-10 NOTE — PROGRESS NOTES
Hospitalist Progress Note      Patient:  Derrek Garcia  YOB: 1975  MRN: [id number]   Acct: [de-identified]   PCP: Diana Marcus  Date of Admission: 11/4/2022    Assessment/Plan:    Schizophrenia: Psych primary and managing. Accelerated HTN, hx of Essential HTN: likely situational given pt's psychosis and agitation in addition to noncompliance. Noted to have emotional outbursts per Psych RN. Home HCTZ was resumed without substantial effect, will add Lisinopril 5 mg and assess response. Recommended to monitor his BP after discharge and f/u with his PCP shortly after discharge. Hyperkalemia, resolved: recheck noted to be wnl  Substance abuse: hx of meth use, noted    Chief Complaint: Schizophrenia    Initial H and P:-    Per initial consult 11/10: \"Ralph Segundo52 y.o. male who we are asked to see/evaluate by Paradise Moss MD for medical management of hypertension. Blood pressures elevated during admission. Most recent blood pressure 175/106 manually. On exam, patient was sleeping in bed. He reports history of primary hypertension but is noncompliant with his medications. At this time he denies chest pain, shortness of breath, lightheadedness or dizziness, abdominal pain, nausea, urinary symptoms. Discussed with patient resuming his home medication of hydrochlorothiazide and that he should have follow-up with PCP. Patient is agreeable. \"    Subjective (past 24 hours):   11/10: pt seen sitting down in psych lobby drinking coffee. He is worried that his [employer] is causing his elevated BP. Denies CP/SOB, dizziness/HA. Past medical history, family history, social history and allergies reviewed again and is unchanged since admission. ROS (All review of systems completed. Pertinent positives noted.  Otherwise All other systems reviewed and negative.)     Medications:  Reviewed    Infusion Medications   Scheduled Medications    hydroCHLOROthiazide  12.5 mg Oral Daily    lisinopril  5 mg Oral Daily    nicotine  1 patch TransDERmal Daily    ARIPiprazole ER  400 mg IntraMUSCular Q21 Days    lurasidone  80 mg Oral Daily     PRN Meds: phenylephrine, neomycin-bacitracin-polymyxin, acetaminophen, ibuprofen, polyethylene glycol, hydrOXYzine HCl, traZODone, ziprasidone (GEODON) in sterile water injection    No intake or output data in the 24 hours ending 11/10/22 1324    Diet:  ADULT DIET; Regular  ADULT ORAL NUTRITION SUPPLEMENT; Breakfast, Lunch, Dinner; Standard High Calorie/High Protein Oral Supplement    Physical Exam:  BP (!) 169/116   Pulse 82   Temp 97.3 °F (36.3 °C) (Oral)   Resp 18   Ht 6' 4\" (1.93 m)   Wt 230 lb (104.3 kg)   SpO2 99%   BMI 28.00 kg/m²   General appearance: anxious appearing, no apparent distress, appears stated age and cooperative. HEENT: Pupils equal, round, and reactive to light. Conjunctivae/corneas clear. Neck: Supple, with full range of motion. No jugular venous distention. Trachea midline. Respiratory:  Normal respiratory effort. Clear to auscultation, bilaterally without Rales/Wheezes/Rhonchi. Cardiovascular: Regular rate and rhythm with normal S1/S2 without murmurs, rubs or gallops. Abdomen: Soft, non-tender, non-distended with normal bowel sounds. Musculoskeletal: passive and active ROM x 4 extremities. Skin: Skin color, texture, turgor normal.  No rashes or lesions. Neurologic:  Neurovascularly intact without any focal sensory/motor deficits. Cranial nerves: II-XII intact, grossly non-focal.  Psychiatric: Alert and oriented, thought content appropriate, reduced insight  Capillary Refill: Brisk,< 3 seconds   Peripheral Pulses: +2 palpable, equal bilaterally     Labs:   No results for input(s): WBC, HGB, HCT, PLT in the last 72 hours.   Recent Labs     11/10/22  0607 11/10/22  0903     --    K 5.3* 4.4   CL 99  --    CO2 31  --    BUN 21  --    CREATININE 1.0  --    CALCIUM 9.4 --      No results for input(s): AST, ALT, BILIDIR, BILITOT, ALKPHOS in the last 72 hours. No results for input(s): INR in the last 72 hours. No results for input(s): Hamp Memory in the last 72 hours. Microbiology:    Blood culture #1: No results found for: BC    Blood culture #2:No results found for: Keisha Courts    Organism:No results found for: ORG    No results found for: LABGRAM    MRSA culture only:No results found for: [facility]    Urine culture: No results found for: LABURIN    Respiratory culture: No results found for: CULTRESP    Aerobic and Anaerobic :  No results found for: LABAERO  No results found for: LABANAE    Urinalysis:    No results found for: Vinh Franklino, BACTERIA, RBCUA, BLOODU, Kelly Meo, GLUCOSEU    Radiology:  No orders to display     No results found.     Electronically signed by Claudia Gardner PA-C on 11/10/2022 at 1:24 PM

## 2022-11-10 NOTE — PLAN OF CARE
Problem: Chronic Conditions and Co-morbidities  Goal: Patient's chronic conditions and co-morbidity symptoms are monitored and maintained or improved  11/9/2022 2212 by Leonel Mulligan RN  Outcome: Progressing  Flowsheets (Taken 11/9/2022 0800 by Abilio Wilson RN)  Care Plan - Patient's Chronic Conditions and Co-Morbidity Symptoms are Monitored and Maintained or Improved: Monitor and assess patient's chronic conditions and comorbid symptoms for stability, deterioration, or improvement  Note: Patient has been seen by a hospitalist for the patient's hypertension. Orders to follow. 11/9/2022 1156 by Abilio Wilson RN  Outcome: Progressing  Flowsheets (Taken 11/9/2022 0800)  Care Plan - Patient's Chronic Conditions and Co-Morbidity Symptoms are Monitored and Maintained or Improved: Monitor and assess patient's chronic conditions and comorbid symptoms for stability, deterioration, or improvement     Problem: Discharge Planning  Goal: Discharge to home or other facility with appropriate resources  11/9/2022 2212 by Leonel Mulligan RN  Outcome: Progressing  Flowsheets (Taken 11/9/2022 2212)  Discharge to home or other facility with appropriate resources: Identify barriers to discharge with patient and caregiver  Note: Discharge planning is in process. 11/9/2022 1156 by Abilio Wilson RN  Outcome: Progressing  Flowsheets (Taken 11/9/2022 0800)  Discharge to home or other facility with appropriate resources: Identify barriers to discharge with patient and caregiver  Note: Patient voices no needs before discharge. Patient plans to be discharged to group home tomorrow. Discharge planner working with patient to achieve optimal discharge plans, specific to individual needs. Problem: Psychosis  Goal: Will report no hallucinations or delusions  11/9/2022 2212 by Leonel Mulligan RN  Outcome: Progressing  Note: Patient denies hallucinations this shift. None observed. No delusional thoughts observed.   11/9/2022 1 [place] by Chaparro Briones RN  Outcome: Progressing  Note: Patient denies hallucinations or delusions so far this shift. Problem: Behavior  Goal: Pt/Family maintain appropriate behavior and adhere to behavioral management agreement, if implemented  11/9/2022 2212 by Deb Hughes RN  Outcome: Progressing  [address] (Taken 11/9/2022 2212)  Patient/family maintains appropriate behavior and adheres to behavioral management agreement, if implemented: Encourage verbalization of thoughts and concerns in a socially appropriate manner  11/9/2022 1156 by Chapraro Briones RN  Outcome: Progressing  Flowsheets (Taken 11/9/2022 0800)  Patient/family maintains appropriate behavior and adheres to behavioral management agreement, if implemented: Assess patient/familys coping skills and  non-compliant behavior (including use of illegal substances)  Note: Patient appropriate with staff and taking medications. Problem: Involuntary Admit  Goal: Will cooperate with staff recommendations and doctor's orders and will demonstrate appropriate behavior  11/9/2022 2216 by Deb Hughes RN  Outcome: Progressing  Note: Patient has been cooperative. 11/9/2022 1156 by Chaparro Briones RN  Outcome: Progressing  Flowsheets (Taken 11/8/2022 0815)  Will cooperate with staff recommendations and doctor's orders and will demonstrate appropriate behavior: Treat underlying conditions and offer medication as ordered  Goal: Risk of elopement will decrease  11/9/2022 2216 by Deb Hughes RN  Outcome: Progressing  Note: No attempt to elope the unit this shift. 11/9/2022 1156 by Chaparro Briones RN  Outcome: Progressing  Note: Patient has not tried to pull on doors or tried to elope from unit.      Problem: Pain  Goal: Verbalizes/displays adequate comfort level or baseline comfort level  11/9/2022 2216 by Deb Hughes RN  Outcome: Progressing  Flowsheets (Taken 11/9/2022 2216)  Verbalizes/displays adequate comfort level or baseline comfort level: Assess pain using appropriate pain scale  Note: No complaints of pain this shift. 11/9/2022 1156 by Charley Falcon RN  Outcome: Progressing  Flowsheets (Taken 11/9/2022 0800)  Verbalizes/displays adequate comfort level or baseline comfort level: Encourage patient to monitor pain and request assistance  Note: Patient denies pain so far this shift. Problem: Coping  Goal: Pt/Family able to verbalize concerns and demonstrate effective coping strategies  11/9/2022 2216 by Jazzy Samuels RN  Outcome: Progressing  Flowsheets (Taken 11/9/2022 2216)  Patient/family able to verbalize anxieties, fears, and concerns, and demonstrate effective coping: Provide emotional support, including active listening and acknowledgement of concerns of patient and caregivers  Note: Patient reports that he miguel ángel by calling his dad and smoking. 11/9/2022 1423 by Angel Garvin  Outcome: Not Progressing  11/9/2022 1156 by Charley Falcon RN  Outcome: Progressing  Flowsheets (Taken 11/9/2022 0800)  Patient/family able to verbalize anxieties, fears, and concerns, and demonstrate effective coping: Assist patient/family to identify coping skills, available support systems and cultural and spiritual values     Problem: Coping  Goal: Pt/Family able to verbalize concerns and demonstrate effective coping strategies  11/9/2022 2216 by Jazzy Samuels RN  Outcome: Progressing  Flowsheets (Taken 11/9/2022 2216)  Patient/family able to verbalize anxieties, fears, and concerns, and demonstrate effective coping: Provide emotional support, including active listening and acknowledgement of concerns of patient and caregivers  Note: Patient reports that he miguel ángel by calling his dad and smoking.   11/9/2022 1423 by Angel Garvin  Outcome: Not Progressing  11/9/2022 1156 by Charley Falcon RN  Outcome: Progressing  Flowsheets (Taken 11/9/2022 0800)  Patient/family able to verbalize anxieties, fears, and concerns, and demonstrate effective coping: Assist patient/family to identify coping skills, available support systems and cultural and spiritual values

## 2022-11-10 NOTE — PROGRESS NOTES
[address] [place] [address]  Day 7/Weekly Interdisciplinary Treatment Plan NOTE    Patient was in treatment team    Admission Type:   Admission Type: Involuntary    Reason for admission:     Estimated Length of Stay Update:  7 days  Estimated Discharge Date Update: 1-2 days    Patient Strengths/Barriers  Strengths (Must Choose Two): Other (comment) [employer])  Barriers: Other (comment) (terrell)  Addictive Behavior:Addictive Behavior  In the Past 3 Months, Have You Felt or Has Someone Told You That You Have a Problem With  : Other (comment) [employer])  Medical Problems:  Past Medical History:   Diagnosis Date    Depression     Elevated BP     Genital warts     History of broken nose     Nicotine dependence        Risk:  Fall Risk   Jose Scale Jose Scale Score: 21    Status EXAM:   Mental Status and Behavioral Exam  Normal: No  Level of Assistance: Independent/Self  Facial Expression: Flat, Brightened  Affect: Appropriate  Level of Consciousness: Alert  Frequency of Checks: 4 times per hour, close  Mood:Normal: No  Mood: Anxious  Motor Activity:Normal: Yes  Motor Activity: Decreased  Eye Contact: Good  Observed Behavior: Cooperative  Sexual Misconduct History: Current - no  Preception: [place] to person, [place] to time, [place] to place  Attention:Normal: Yes  Attention: Distractible  Thought Processes: Circumstantial  Thought Content:Normal: Yes  Thought Content: Paranoia  Depression Symptoms: No problems reported or observed. Anxiety Symptoms: Generalized  Ericka Symptoms: No problems reported or observed.   Hallucinations: None  Delusions: No  Memory:Normal: Yes  Memory: Poor recent  Insight and Judgment: No  Insight and Judgment: Poor judgment, Poor insight    Daily Assessment Last Entry:   Daily Sleep (WDL): Within Defined Limits            Daily Nutrition (WDL): Within Defined Limits  Level of Assistance: Independent/Self    Patient Monitoring:  Frequency of Checks: 4 times per hour, close    Psychiatric Symptoms: Depression Symptoms  Depression Symptoms: No problems reported or observed. Anxiety Symptoms  Anxiety Symptoms: Generalized  Ericka Symptoms  Ericka Symptoms: No problems reported or observed. Suicide Risk CSSR-S:  1) Within the past month, have you wished you were dead or wished you could go to sleep and not wake up? : No  2) Have you actually had any thoughts of killing yourself? : No  6) Have you ever done anything, started to do anything, or prepared to do anything to end your life?: No    EDUCATION:   Learner Progress Toward Treatment Goals: Reviewed results and recommendations of this team, Reviewed group plan and strategies, Reviewed signs, symptoms and risk of self harm and violent behavior, and Reviewed goals and plan of care    Method: Individual    Outcome: Verbalized understanding, Demonstrated Understanding, and Needs reinforcement    PATIENT GOALS: Stabilize pt psychiatric symptoms. PLAN/TREATMENT RECOMMENDATIONS UPDATE:  Are there any lingering problems that need to be addressed? Awaiting guardianship so that pt can go to [facility].    Discharge plans that are set-up or in process Yes    GOALS UPDATE:   Time frame for Short-Term Goals: Daily      Kathlee Baumgarten, LSW

## 2022-11-10 NOTE — PROGRESS NOTES
Group Therapy Note    Date: 11/9/2022  Start Time: 2000  End Time:  2020      Type of Group: Wrap-Up      Patient's Goal:  To get out. Notes:  Possible discharge in the AM    Status After Intervention:  Improved    Participation Level:  Active Listener and Interactive    Participation Quality: Appropriate and Attentive      Speech:  normal      Thought Process/Content: Linear      Affective Functioning: Congruent and Blunted      Mood: anxious      Level of consciousness:  Alert and Attentive      Response to Learning: Progressing to goal      Endings: None Reported    Modes of Intervention: Support      Discipline Responsible: Registered Nurse      Signature:  Zayra Carr RN

## 2022-11-10 NOTE — PROGRESS NOTES
Case Management [id number] spoke with Sylvia Camara, [phone], who is the Director of [address] [place] [address]. Regina shared that due to pt now going to [address] [place] [address] in [place], The guardian has changed back to [employer] who is filing for emergency Guardianship. Guardianship is required for pt to be admitted so that he can not sign himself out and \"flee. \"  Sylvia Camara asked that clinical information be faxed to her at [phone].

## 2022-11-10 NOTE — CONSULTS
Hospitalist Consult Note        Patient:  Ayesha Key  YOB: 1975  Date of Service: 11/10/2022  MRN: [id number]   Acct:  [de-identified]   Primary Care Physician: Wilder Bauman    Chief Complaint:  Schizophrenia  Reason for consult  hypertension    Date of Service: Pt seen/examined in consultation on 11/10/2022     History Of Present Illness:      Annet[age] Yang y.o. male who we are asked to see/evaluate by Raj Interiano MD for medical management of hypertension. Blood pressures elevated during admission. Most recent blood pressure 175/106 manually. On exam, patient was sleeping in bed. He reports history of primary hypertension but is noncompliant with his medications. At this time he denies chest pain, shortness of breath, lightheadedness or dizziness, abdominal pain, nausea, urinary symptoms. Discussed with patient resuming his home medication of hydrochlorothiazide and that he should have follow-up with PCP. Patient is agreeable. Assessment and Plan:-  Schizophrenia: Managed per primary team.  Primary Hypertension: Blood pressure 175/106 this evening. Blood pressure has been elevated during admission. Appears patient is supposed to be on hydrochlorothiazide as well as possibly atenolol. He is noncompliant with medications. Will start hydrochlorothiazide. Check BMP in the morning. Recommend follow-up with PCP once discharged. Past Medical History:        Diagnosis Date    Depression     Elevated BP     Genital warts     History of broken nose     Nicotine dependence        Past Surgical History:        Procedure Laterality Date    WISDOM TOOTH EXTRACTION         Home Medications:   No current facility-administered medications on file prior to encounter.      Current Outpatient Medications on File Prior to Encounter   Medication Sig Dispense Refill    cariprazine hcl (VRAYLAR) 3 MG CAPS capsule Take 3 mg by mouth daily      bumetanide (BUMEX) 1 MG tablet  (Patient not taking: No sig reported)      hydrochlorothiazide (MICROZIDE) 12.5 MG capsule  (Patient not taking: No sig reported)      azelastine (ASTELIN) 0.1 % nasal spray 2 sprays by Nasal route (Patient not taking: No sig reported)      ARIPiprazole ER (ABILIFY MAINTENA) 400 MG SUSR Inject 400 mg into the muscle every 30 days (Patient not taking: No sig reported)         Allergies:    Haldol [haloperidol], Penicillin v, Risperdal [risperidone], and Zyprexa [olanzapine]    Social History:    reports that he has been smoking cigarettes. He has been smoking an average of 2 packs per day. He has quit using smokeless tobacco. He reports current alcohol use. He reports that he does not use drugs. Family History:       Problem Relation Age of Onset    Cancer Father         skin       Diet:  ADULT DIET; Regular  ADULT ORAL NUTRITION SUPPLEMENT; Breakfast, Lunch, Dinner; Standard High Calorie/High Protein Oral Supplement    Review of systems:   Pertinent positives as noted in the HPI. All other systems reviewed and negative. PHYSICAL EXAM:  BP (!) 175/120   Pulse 94   Temp 97.7 °F (36.5 °C) (Tympanic)   Resp 16   Ht 6' 4\" (1.93 m)   Wt 230 lb (104.3 kg)   SpO2 95%   BMI 28.00 kg/m²   General appearance: No apparent distress, appears stated age and cooperative. HEENT: Normal cephalic, atraumatic without obvious deformity. Pupils equal, round, and reactive to light. Extra ocular muscles intact. Neck: Supple, with full range of motion. No jugular venous distention. Trachea midline. Respiratory:  Normal respiratory effort. Clear to auscultation, bilaterally without Rales/Wheezes/Rhonchi. Cardiovascular: Regular rate and rhythm with normal S1/S2 without murmurs, rubs or gallops. Abdomen: Soft, non-tender, non-distended with normal bowel sounds. Musculoskeletal:  No clubbing, cyanosis or edema bilaterally. Skin: Skin color, texture, turgor normal.  No rashes or lesions.   Neurologic:  Neurovascularly intact without any focal sensory/motor deficits. Psychiatric: Alert and oriented  Capillary Refill: Brisk,< 3 seconds   Peripheral Pulses: +2 palpable, equal bilaterally     Labs:   No results for input(s): WBC, HGB, HCT, PLT in the last 72 hours. No results for input(s): NA, K, CL, CO2, BUN, CREATININE, CALCIUM, PHOS in the last 72 hours. Invalid input(s): MAGNES  No results for input(s): AST, ALT, BILIDIR, BILITOT, ALKPHOS in the last 72 hours. No results for input(s): INR in the last 72 hours. No results for input(s): Hamp Memory in the last 72 hours. Urinalysis:    No results found for: Vinh Harris, BACTERIA, RBCUA, BLOODU, [address], [address]    Radiology:   No orders to display     No results found.       EKG: none      THANK YOU FOR THE CONSULT    Electronically signed by EVERTON Mc CNP on 11/10/2022 at 12:36 AM

## 2022-11-10 NOTE — PLAN OF CARE
Patient has not attended any of the groups today and has not been out of his room for social interaction this shift so he has not been able to demonstrate effective coping strategies.      Problem: Psychosis  Goal: Will report no hallucinations or delusions  11/10/2022 1150 by Kee Venegas RN  Outcome: Not Progressing  Note: Pt observed talking to self     Problem: Coping  Goal: Pt/Family able to verbalize concerns and demonstrate effective coping strategies  11/10/2022 1439 by Drew Duong  Outcome: Not Progressing  11/10/2022 1150 by Kee Venegas RN  Outcome: Not Progressing  Flowsheets (Taken 11/10/2022 1137)  Patient/family able to verbalize anxieties, fears, and concerns, and demonstrate effective coping:   Provide emotional support, including active listening and acknowledgement of concerns of patient and caregivers   Reduce environmental stimuli, as able   Instruct patient/family in relaxation techniques, as appropriate  Note: Pt agitated when told he was not being discharged today, verbally aggressive

## 2022-11-11 VITALS
WEIGHT: 230 LBS | HEART RATE: 90 BPM | OXYGEN SATURATION: 91 % | RESPIRATION RATE: 18 BRPM | SYSTOLIC BLOOD PRESSURE: 128 MMHG | DIASTOLIC BLOOD PRESSURE: 89 MMHG | BODY MASS INDEX: 28.01 KG/M2 | HEIGHT: 76 IN | TEMPERATURE: 97.3 F

## 2022-11-11 PROCEDURE — 6370000000 HC RX 637 (ALT 250 FOR IP)

## 2022-11-11 PROCEDURE — 6370000000 HC RX 637 (ALT 250 FOR IP): Performed by: PHYSICIAN ASSISTANT

## 2022-11-11 PROCEDURE — 6370000000 HC RX 637 (ALT 250 FOR IP): Performed by: PSYCHIATRY & NEUROLOGY

## 2022-11-11 PROCEDURE — 5130000000 HC BRIDGE APPOINTMENT

## 2022-11-11 RX ORDER — LISINOPRIL 5 MG/1
5 TABLET ORAL DAILY
Qty: 30 TABLET | Refills: 0 | Status: SHIPPED | OUTPATIENT
Start: 2022-11-12

## 2022-11-11 RX ORDER — HYDROCHLOROTHIAZIDE 12.5 MG/1
12.5 CAPSULE, GELATIN COATED ORAL DAILY
Qty: 30 CAPSULE | Refills: 0 | Status: SHIPPED | OUTPATIENT
Start: 2022-11-12 | End: 2022-11-11 | Stop reason: SDUPTHER

## 2022-11-11 RX ORDER — HYDROXYZINE 50 MG/1
50 TABLET, FILM COATED ORAL 3 TIMES DAILY PRN
Qty: 60 TABLET | Refills: 0 | Status: SHIPPED | OUTPATIENT
Start: 2022-11-11 | End: 2022-12-11

## 2022-11-11 RX ORDER — HYDROXYZINE 50 MG/1
50 TABLET, FILM COATED ORAL 3 TIMES DAILY PRN
Qty: 60 TABLET | Refills: 0 | Status: SHIPPED | OUTPATIENT
Start: 2022-11-11 | End: 2022-11-11 | Stop reason: SDUPTHER

## 2022-11-11 RX ORDER — HYDROCHLOROTHIAZIDE 12.5 MG/1
12.5 CAPSULE, GELATIN COATED ORAL DAILY
Qty: 30 CAPSULE | Refills: 0 | Status: SHIPPED | OUTPATIENT
Start: 2022-11-12

## 2022-11-11 RX ORDER — LISINOPRIL 5 MG/1
5 TABLET ORAL DAILY
Qty: 30 TABLET | Refills: 0 | Status: SHIPPED | OUTPATIENT
Start: 2022-11-12 | End: 2022-11-11 | Stop reason: SDUPTHER

## 2022-11-11 RX ADMIN — LISINOPRIL 5 MG: 5 TABLET ORAL at 08:00

## 2022-11-11 RX ADMIN — ANTACID TABLETS 500 MG: 500 TABLET, CHEWABLE ORAL at 09:16

## 2022-11-11 RX ADMIN — LURASIDONE HYDROCHLORIDE 80 MG: 40 TABLET, FILM COATED ORAL at 08:00

## 2022-11-11 RX ADMIN — HYDROCHLOROTHIAZIDE 12.5 MG: 12.5 CAPSULE ORAL at 08:00

## 2022-11-11 ASSESSMENT — PAIN SCALES - GENERAL: PAINLEVEL_OUTOF10: 0
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
## 2022-11-11 NOTE — PROGRESS NOTES
This RN has reviewed and agrees with JOVITA Vasquez LPN's data collection and has collaborated with this LPN regarding the patient's care plan.

## 2022-11-11 NOTE — PLAN OF CARE
Problem: Chronic Conditions and Co-morbidities  Goal: Patient's chronic conditions and co-morbidity symptoms are monitored and maintained or improved  11/10/2022 2005 by Ct George LPN  Outcome: Progressing  Flowsheets (Taken 11/9/2022 0800 by Isael Casanova RN)  Care Plan - Patient's Chronic Conditions and Co-Morbidity Symptoms are Monitored and Maintained or Improved: Monitor and assess patient's chronic conditions and comorbid symptoms for stability, deterioration, or improvement  11/10/2022 1150 by Marsha Burks RN  Outcome: Progressing     Problem: Discharge Planning  Goal: Discharge to home or other facility with appropriate resources  11/10/2022 2005 by Ct George LPN  Outcome: [place] (Taken 11/9/2022 2212 by Chelsie Ribeiro RN)  Discharge to home or other facility with appropriate resources: Identify barriers to discharge with patient and caregiver  Note: Discharge planning in process. Pt is set for discharge 11/11/2022 and will go to [facility] in [place] and follow up with [facility]  11/10/2022 1150 by Marsha Burks RN  Outcome: Progressing     Problem: Psychosis  Goal: Will report no hallucinations or delusions  Description: INTERVENTIONS:  1. Administer medication as  ordered  2. Assist with reality testing to support increasing orientation  3. Assess if patient's hallucinations or delusions are encouraging self harm or harm to others and intervene as appropriate  11/10/2022 2005 by Ct George LPN  Outcome: Progressing  Note: Pt denies hallucinations and delusions. 11/10/2022 1150 by Marsha Burks RN  Outcome: Not Progressing  Note: Pt observed talking to self     Problem: Behavior  Goal: Pt/Family maintain appropriate behavior and adhere to behavioral management agreement, if implemented  Description: INTERVENTIONS:  1. Assess patient/family's coping skills and  non-compliant behavior (including use of illegal substances)  2.  Notify security of behavior or suspected illegal substances which indicate the need for search of the family and/or belongings  3. Encourage verbalization of thoughts and concerns in a socially appropriate manner  4. Utilize positive, consistent limit setting strategies supporting safety of patient, staff and others  5. Encourage participation in the decision making process about the behavioral management agreement  6. If a visitor's behavior poses a threat to safety call refer to organization policy. 7. Initiate consult with , Psychosocial CNS, Spiritual Care as appropriate  11/10/2022 2005 by Nena Valencia LPN  Outcome: Progressing  [address] (Taken 11/10/2022 1137 by Troy Lynn, RN)  Patient/family maintains appropriate behavior and adheres to behavioral management agreement, if implemented: Assess patient/familys coping skills and  non-compliant behavior (including use of illegal substances)  Note: Pt has maintained appropriate behaviors so far this shift. 11/10/2022 1150 by Troy Lynn, RN  Outcome: Progressing  Flowsheets (Taken 11/10/2022 1137)  Patient/family maintains appropriate behavior and adheres to behavioral management agreement, if implemented: Assess patient/familys coping skills and  non-compliant behavior (including use of illegal substances)     Problem: Involuntary Admit  Goal: Will cooperate with staff recommendations and doctor's orders and will demonstrate appropriate behavior  Description: INTERVENTIONS:  1. Treat underlying conditions and offer medication as ordered  2. Educate regarding involuntary admission procedures and rules  3.  Contain excessive/inappropriate behavior per unit and hospital policies  07/21/5663 9460 by Nena Valencia LPN  Outcome: Nilay Abbotta (Taken 11/10/2022 1137 by Troy Lynn, RN)  Will cooperate with staff recommendations and doctor's orders and will demonstrate appropriate behavior: Educate regarding involuntary admission procedures and rules  Note: Pt has remained cooperative with staff and doctor's orders. 11/10/2022 1150 by Troy Lynn RN  Outcome: Progressing  Flowsheets (Taken 11/10/2022 1137)  Will cooperate with staff recommendations and doctor's orders and will demonstrate appropriate behavior: Educate regarding involuntary admission procedures and rules  Goal: Risk of elopement will decrease  Description: Risk of elopement will decrease  11/10/2022 1150 by Troy Lynn RN  Outcome: Progressing     Problem: Pain  Goal: Verbalizes/displays adequate comfort level or baseline comfort level  11/10/2022 2005 by Nena Valencia LPN  Outcome: Nilay Radha (Taken 11/9/2022 2216 by Jazzy Samuels RN)  Verbalizes/displays adequate comfort level or baseline comfort level: Assess pain using appropriate pain scale  11/10/2022 1150 by Troy Lynn RN  Outcome: Progressing     Problem: Coping  Goal: Pt/Family able to verbalize concerns and demonstrate effective coping strategies  Description: INTERVENTIONS:  1. Assist patient/family to identify coping skills, available support systems and cultural and spiritual values  2. Provide emotional support, including active listening and acknowledgement of concerns of patient and caregivers  3. Reduce environmental stimuli, as able  4. Instruct patient/family in relaxation techniques, as appropriate  5.  Assess for spiritual pain/suffering and initiate Spiritual Care, Psychosocial Clinical Specialist consults as needed  11/10/2022 2005 by Nena Valencia LPN  Outcome: Progressing  Flowsheets (Taken 11/10/2022 1137 by Troy Lynn RN)  Patient/family able to verbalize anxieties, fears, and concerns, and demonstrate effective coping:   Provide emotional support, including active listening and acknowledgement of concerns of patient and caregivers   Reduce environmental stimuli, as able   Instruct patient/family in relaxation techniques, as appropriate  11/10/2022 1439 by Angel Garvin  Outcome: Not Progressing  11/10/2022 1150 by Darek Burnett RN  Outcome: Not Progressing  Flowsheets (Taken 11/10/2022 1137)  Patient/family able to verbalize anxieties, fears, and concerns, and demonstrate effective coping:   Provide emotional support, including active listening and acknowledgement of concerns of patient and caregivers   Reduce environmental stimuli, as able   Instruct patient/family in relaxation techniques, as appropriate  Note: Pt agitated when told he was not being discharged today, verbally aggressive       Care plan reviewed with patient and does verbalize understanding of the plan of care and contribute to goal setting.

## 2022-11-11 NOTE — PROGRESS NOTES
Behavioral Health   Discharge Note    Pt discharged with followings belongings:   Dental Appliances: None  Vision - Corrective Lenses: None  Hearing Aid: None  Jewelry: None  Body Piercings Removed: N/A  Clothing: Belt, Footwear, Pants, Shirt, Shorts  Other Valuables: Wallet, Lighter/Matches, Other (Comment) (sun glasses)   Valuables retrieved from safe, security envelope number:  N/A and returned to patient. Patient left department with staff via ambulatory. Discharged to [address] [place] [address]. \"An Important Message from Medicare About Your Rights\" (IMM) form photocopy original from admission and provided to pt at least 4 hours prior to discharge yes. If pt left within 4 hours of receiving 2nd delivery of IMM, this is because pt was agreeable with hospital discharge. Patient/guardian education on aftercare instructions: Yes  Bridge appointment completed:  yes. Reviewed Discharge Instructions with patient/family/nursing facility. Patient/family verbalizes understanding and agreement with the discharge plan using the teachback method. Patient/family verbalize understanding of AVS:Yes    Status EXAM upon discharge:  Mental Status and Behavioral Exam  Normal: No  Level of Assistance: Independent/Self  Facial Expression: Flat  Affect: Blunt  Level of Consciousness: Alert  Frequency of Checks: 4 times per hour, close  Mood:Normal: No  Mood: Suspicious  Motor Activity:Normal: No  Motor Activity: Agitated  Eye Contact: Good  Observed Behavior: Cooperative  Sexual Misconduct History: Current - no  Preception: [place] to person, [place] to time, [place] to place  Attention:Normal: Yes  Attention: Hyperalert  Thought Processes: Circumstantial  Thought Content:Normal: Yes  Thought Content: Paranoia  Depression Symptoms: No problems reported or observed. Anxiety Symptoms: No problems reported or observed. Ericka Symptoms: No problems reported or observed.   Hallucinations: None  Delusions: No  Memory:Normal: No  Memory: Poor recent  Insight and Judgment: No  Insight and Judgment: Poor judgment, Poor insight    Janiya Baird RN

## 2022-11-11 NOTE — PROGRESS NOTES
Discharge planning- [facility] , nurse and Dr. Manjinder Howell are all aware that patient is leaving and needs follow-up next week.  Robert So will be contacted by [facility] next week with appointment

## 2022-11-11 NOTE — PROGRESS NOTES
Daily Progress Note  Geoffrey Woodruff MD  11/11/2022    Reviewed patient's current plan of care and vital signs with nursing staff. Sleep:  6 hours last night broken  Attending groups: No  No reported Suicidal thought; he denies hallucinations but he talks to himself at times. Limited interaction with peers & staff. Mood 7 on a scale of 1 to 10 with 10 is feeling normal.      SUBJECTIVE:    Patient is feeling better. SUICIDAL IDEATION denies suicidal ideation. Patient does not have medication side effects. ROS: Patient has new complaints:  No  Sleeping adequately:  Yes  Visitors: No    Mental Status Examination:  Patient is cooperative. Speech: Normal rate and tone. No abnormal movements, tics or mannerisms. Mood euthymic; affect normal affect. Suicidal ideation Absent. Homicidal ideations Absent. Hallucinations Absent. Delusions less prominent. Thought Content: Appropriate. Thought Processes: Goal directed. Alert and oriented X 3. Attention and concentration Fair. MEMORY intact. Insight and Judgement impaired judgment. Data   height is 6' 4\" (1.93 m) and weight is 230 lb (104.3 kg). His temperature is 97.3 °F (36.3 °C). His blood pressure is 128/89 and his pulse is 90. His respiration is 18 and oxygen saturation is 91%.    Labs:            Medications  Current Facility-Administered Medications: hydroCHLOROthiazide (MICROZIDE) capsule 12.5 mg, 12.5 mg, Oral, Daily  lisinopril (PRINIVIL;ZESTRIL) tablet 5 mg, 5 mg, Oral, Daily  calcium carbonate (TUMS) chewable tablet 500 mg, 500 mg, Oral, TID PRN  nicotine (NICODERM CQ) 21 MG/24HR 1 patch, 1 patch, TransDERmal, Daily  phenylephrine (LUCERO-SYNEPHRINE) 0.5 % nasal spray 1 spray, 1 spray, Each Nostril, Q6H PRN  ARIPiprazole ER (ABILIFY MAINTENA) 400 mg, 400 mg, IntraMUSCular, Q21 Days  lurasidone (LATUDA) tablet 80 mg, 80 mg, Oral, Daily  neomycin-bacitracin-polymyxin (NEOSPORIN) ointment, , Topical, BID PRN  acetaminophen (TYLENOL) tablet 650 mg, 650 mg, Oral, Q4H PRN  ibuprofen (ADVIL;MOTRIN) tablet 400 mg, 400 mg, Oral, Q6H PRN  polyethylene glycol (GLYCOLAX) packet 17 g, 17 g, Oral, Daily PRN  hydrOXYzine HCl (ATARAX) tablet 50 mg, 50 mg, Oral, TID PRN  traZODone (DESYREL) tablet 50 mg, 50 mg, Oral, Nightly PRN  ziprasidone (GEODON) 10 mg in sterile water 0.5 mL injection, 10 mg, IntraMUSCular, TID PRN    ASSESSMENT  Schizophrenia (HCC)     PLAN  Patient's symptoms are improving  Continue with current medications. Attempt to develop insight  Psycho-education conducted. Supportive Therapy conducted. Probable discharge is today  Follow-up @ [facility].

## 2022-11-14 ENCOUNTER — TELEPHONE (OUTPATIENT)
Dept: PSYCHIATRY | Age: 47
End: 2022-11-14

## 2022-11-14 NOTE — TELEPHONE ENCOUNTER
0930 4E Post Discharge Call Back Program. Called patient. Patient had two numbers listed on medical record and both had been discontinued.

## 2023-06-14 ENCOUNTER — HOSPITAL ENCOUNTER (INPATIENT)
Age: 48
LOS: 7 days | Discharge: HOME | DRG: 291 | End: 2023-06-21
Attending: FAMILY MEDICINE
Payer: MEDICARE

## 2023-06-14 ENCOUNTER — APPOINTMENT (OUTPATIENT)
Dept: GENERAL RADIOLOGY | Age: 48
DRG: 291 | End: 2023-06-14
Attending: FAMILY MEDICINE
Payer: MEDICARE

## 2023-06-14 DIAGNOSIS — T56.891A LITHIUM TOXICITY, ACCIDENTAL OR UNINTENTIONAL, INITIAL ENCOUNTER: Primary | ICD-10-CM

## 2023-06-14 DIAGNOSIS — J44.9 CHRONIC OBSTRUCTIVE PULMONARY DISEASE, UNSPECIFIED COPD TYPE (HCC): ICD-10-CM

## 2023-06-14 DIAGNOSIS — R06.83 SNORING: ICD-10-CM

## 2023-06-14 DIAGNOSIS — D75.1 ERYTHROCYTOSIS: ICD-10-CM

## 2023-06-14 DIAGNOSIS — I50.33 ACUTE ON CHRONIC DIASTOLIC CONGESTIVE HEART FAILURE (HCC): ICD-10-CM

## 2023-06-14 DIAGNOSIS — G47.30 SLEEP APNEA, UNSPECIFIED TYPE: ICD-10-CM

## 2023-06-14 DIAGNOSIS — F17.210 CIGARETTE NICOTINE DEPENDENCE WITHOUT COMPLICATION: ICD-10-CM

## 2023-06-14 PROBLEM — N17.9 AKI (ACUTE KIDNEY INJURY) (HCC): Status: ACTIVE | Noted: 2023-06-14

## 2023-06-14 LAB
ALBUMIN SERPL BCG-MCNC: 3.8 G/DL (ref 3.5–5.1)
ALBUMIN SERPL BCP-MCNC: 3.3 GM/DL (ref 3.4–5)
ALP SERPL-CCNC: 103 U/L (ref 46–116)
ALP SERPL-CCNC: 94 U/L (ref 38–126)
ALT SERPL W P-5'-P-CCNC: 57 U/L (ref 14–63)
ALT SERPL W/O P-5'-P-CCNC: 40 U/L (ref 11–66)
AMORPH SED URNS QL MICRO: ABNORMAL
AMPHETAMINE+METHAMPHETAMIE: NEGATIVE
ANALYZED BY:: NORMAL
ANION GAP SERPL CALC-SCNC: 1 MEQ/L (ref 8–16)
ANION GAP SERPL CALC-SCNC: 6 MEQ/L (ref 8–16)
AST SERPL W P-5'-P-CCNC: 23 U/L (ref 15–37)
AST SERPL-CCNC: 20 U/L (ref 5–40)
BACTERIA URNS QL MICRO: ABNORMAL
BARBITURATES: NEGATIVE
BASOPHILS ABSOLUTE: 0.1 THOU/MM3 (ref 0–0.1)
BASOPHILS NFR BLD AUTO: 0.6 %
BENZODIAZEPINES: NEGATIVE
BILIRUB SERPL-MCNC: 0.6 MG/DL (ref 0.3–1.2)
BILIRUB SERPL-MCNC: 0.8 MG/DL (ref 0.2–1)
BILIRUB UR QL STRIP.AUTO: NEGATIVE
BUN SERPL-MCNC: 23 MG/DL (ref 7–22)
BUN SERPL-MCNC: 25 MG/DL (ref 7–18)
CALCIUM SERPL-MCNC: 8.4 MG/DL (ref 8.5–10.1)
CALCIUM SERPL-MCNC: 8.6 MG/DL (ref 8.5–10.5)
CANNABINOIDS: NEGATIVE
CASTS #/AREA URNS LPF: ABNORMAL /LPF
CHARACTER UR: CLEAR
CHLORIDE SERPL-SCNC: 97 MEQ/L (ref 98–107)
CHLORIDE SERPL-SCNC: 98 MEQ/L (ref 98–111)
CO2 SERPL-SCNC: 35 MEQ/L (ref 23–33)
CO2 SERPL-SCNC: 39 MEQ/L (ref 21–32)
COCAINE METABOLITE: NEGATIVE
COLOR UR AUTO: YELLOW
CREAT SERPL-MCNC: 1.2 MG/DL (ref 0.4–1.2)
CREAT SERPL-MCNC: 1.6 MG/DL (ref 0.6–1.3)
CRYSTALS VITF MICRO: ABNORMAL
DATE OF COLLECTION: NORMAL
DEPRECATED RDW RBC AUTO: 58.5 FL (ref 35–45)
DRAWN BY: NORMAL
EOSINOPHIL NFR BLD AUTO: 1.5 %
EOSINOPHILS ABSOLUTE: 0.2 THOU/MM3 (ref 0–0.4)
EPI CELLS #/AREA URNS HPF: ABNORMAL /HPF
ERYTHROCYTE [DISTWIDTH] IN BLOOD BY AUTOMATED COUNT: 18 % (ref 11.5–14.5)
ETHANOL SERPL-MCNC: < 0.01 % (GM/DL)
GFR SERPL CREATININE-BSD FRML MDRD: 53 ML/MIN/1.73M2
GFR SERPL CREATININE-BSD FRML MDRD: > 60 ML/MIN/1.73M2
GLUCOSE SERPL-MCNC: 103 MG/DL (ref 74–106)
GLUCOSE SERPL-MCNC: 89 MG/DL (ref 70–108)
GLUCOSE UR QL STRIP.AUTO: NEGATIVE MG/DL
HCT VFR BLD AUTO: 64.3 % (ref 42–52)
HGB BLD-MCNC: 18.1 GM/DL (ref 14–18)
HGB UR STRIP.AUTO-MCNC: NEGATIVE MG/L
HYPOCHROMIA BLD QL SMEAR: PRESENT
IMM GRANULOCYTES # BLD AUTO: 0.06 THOU/MM3 (ref 0–0.07)
IMM GRANULOCYTES NFR BLD AUTO: 0.4 %
KETONES UR QL STRIP.AUTO: NEGATIVE
LEUKOCYTE ESTERASE UR QL STRIP.AUTO: NEGATIVE
LITHIUM SERPL-SCNC: 1.23 MMOL/L (ref 0.6–1.2)
LITHIUM SERPL-SCNC: 1.49 MMOL/L (ref 0.6–1.2)
LYMPHOCYTES ABSOLUTE: 1.9 THOU/MM3 (ref 1–4.8)
LYMPHOCYTES NFR BLD AUTO: 12.9 %
Lab: 1518
Lab: NORMAL
MAGNESIUM SERPL-MCNC: 2.4 MG/DL (ref 1.6–2.4)
MCH RBC QN AUTO: 27.2 PG (ref 26–33)
MCHC RBC AUTO-ENTMCNC: 28.1 GM/DL (ref 32.2–35.5)
MCV RBC AUTO: 96.5 FL (ref 80–94)
MONOCYTES ABSOLUTE: 1.8 THOU/MM3 (ref 0.4–1.3)
MONOCYTES NFR BLD AUTO: 12.4 %
MUCOUS THREADS URNS QL MICRO: ABNORMAL
NEUTROPHILS NFR BLD AUTO: 72.2 %
NITRITE UR QL STRIP.AUTO: NEGATIVE
NRBC BLD AUTO-RTO: 0 /100 WBC
OPIATES: NEGATIVE
OXYCODONE: NEGATIVE
PH UR STRIP.AUTO: 6 [PH] (ref 5–9)
PHENCYCLIDINE: NEGATIVE
PHOSPHATE SERPL-MCNC: 4 MG/DL (ref 2.4–4.7)
PLATELET # BLD AUTO: 196 THOU/MM3 (ref 130–400)
PLATELET BLD QL SMEAR: ADEQUATE
PMV BLD AUTO: 10.9 FL (ref 9.4–12.4)
POLYCHROMASIA BLD QL SMEAR: ABNORMAL
POTASSIUM SERPL-SCNC: 4.7 MEQ/L (ref 3.5–5.1)
POTASSIUM SERPL-SCNC: 5.2 MEQ/L (ref 3.5–5.2)
PROT SERPL-MCNC: 5.6 G/DL (ref 6.1–8)
PROT SERPL-MCNC: 6.5 GM/DL (ref 6.4–8.2)
PROT UR STRIP.AUTO-MCNC: 100 MG/DL
RBC # BLD AUTO: 6.66 MILL/MM3 (ref 4.7–6.1)
RBC #/AREA URNS HPF: ABNORMAL /HPF
REFLEX TO URINE C & S: ABNORMAL
SCAN OF BLOOD SMEAR: NORMAL
SEGMENTED NEUTROPHILS ABSOLUTE COUNT: 10.4 THOU/MM3 (ref 1.8–7.7)
SODIUM SERPL-SCNC: 137 MEQ/L (ref 136–145)
SODIUM SERPL-SCNC: 139 MEQ/L (ref 135–145)
SP GR UR STRIP.AUTO: 1.02 (ref 1–1.03)
TROPONIN, HIGH SENSITIVITY: 19.8 PG/ML (ref 0–76.1)
UROBILINOGEN UR STRIP-ACNC: 0.2 EU/DL (ref 0–1)
WBC # BLD AUTO: 14.4 THOU/MM3 (ref 4.8–10.8)
WBC # UR STRIP.AUTO: ABNORMAL /HPF

## 2023-06-14 PROCEDURE — 84100 ASSAY OF PHOSPHORUS: CPT

## 2023-06-14 PROCEDURE — 85025 COMPLETE CBC W/AUTO DIFF WBC: CPT

## 2023-06-14 PROCEDURE — 82077 ASSAY SPEC XCP UR&BREATH IA: CPT

## 2023-06-14 PROCEDURE — 6370000000 HC RX 637 (ALT 250 FOR IP): Performed by: STUDENT IN AN ORGANIZED HEALTH CARE EDUCATION/TRAINING PROGRAM

## 2023-06-14 PROCEDURE — 80053 COMPREHEN METABOLIC PANEL: CPT

## 2023-06-14 PROCEDURE — 83735 ASSAY OF MAGNESIUM: CPT

## 2023-06-14 PROCEDURE — 74018 RADEX ABDOMEN 1 VIEW: CPT

## 2023-06-14 PROCEDURE — 36415 COLL VENOUS BLD VENIPUNCTURE: CPT

## 2023-06-14 PROCEDURE — 99285 EMERGENCY DEPT VISIT HI MDM: CPT

## 2023-06-14 PROCEDURE — 1200000003 HC TELEMETRY R&B

## 2023-06-14 PROCEDURE — 80305 DRUG TEST PRSMV DIR OPT OBS: CPT

## 2023-06-14 PROCEDURE — 71046 X-RAY EXAM CHEST 2 VIEWS: CPT

## 2023-06-14 PROCEDURE — 80178 ASSAY OF LITHIUM: CPT

## 2023-06-14 PROCEDURE — 6360000002 HC RX W HCPCS: Performed by: STUDENT IN AN ORGANIZED HEALTH CARE EDUCATION/TRAINING PROGRAM

## 2023-06-14 PROCEDURE — 84484 ASSAY OF TROPONIN QUANT: CPT

## 2023-06-14 PROCEDURE — 81001 URINALYSIS AUTO W/SCOPE: CPT

## 2023-06-14 PROCEDURE — 2580000003 HC RX 258: Performed by: FAMILY MEDICINE

## 2023-06-14 RX ORDER — ONDANSETRON 2 MG/ML
4 INJECTION INTRAMUSCULAR; INTRAVENOUS EVERY 6 HOURS PRN
Status: DISCONTINUED | OUTPATIENT
Start: 2023-06-14 | End: 2023-06-21 | Stop reason: HOSPADM

## 2023-06-14 RX ORDER — SODIUM CHLORIDE 9 MG/ML
INJECTION, SOLUTION INTRAVENOUS CONTINUOUS
Status: DISCONTINUED | OUTPATIENT
Start: 2023-06-14 | End: 2023-06-14

## 2023-06-14 RX ORDER — SODIUM CHLORIDE 0.9 % (FLUSH) 0.9 %
5-40 SYRINGE (ML) INJECTION PRN
Status: DISCONTINUED | OUTPATIENT
Start: 2023-06-14 | End: 2023-06-21 | Stop reason: HOSPADM

## 2023-06-14 RX ORDER — SENNA AND DOCUSATE SODIUM 50; 8.6 MG/1; MG/1
2 TABLET, FILM COATED ORAL DAILY
Status: DISCONTINUED | OUTPATIENT
Start: 2023-06-15 | End: 2023-06-21 | Stop reason: HOSPADM

## 2023-06-14 RX ORDER — SODIUM CHLORIDE 0.9 % (FLUSH) 0.9 %
5-40 SYRINGE (ML) INJECTION EVERY 12 HOURS SCHEDULED
Status: DISCONTINUED | OUTPATIENT
Start: 2023-06-14 | End: 2023-06-21 | Stop reason: HOSPADM

## 2023-06-14 RX ORDER — POLYETHYLENE GLYCOL 3350 17 G/17G
17 POWDER, FOR SOLUTION ORAL DAILY PRN
Status: DISCONTINUED | OUTPATIENT
Start: 2023-06-14 | End: 2023-06-21 | Stop reason: HOSPADM

## 2023-06-14 RX ORDER — ACETAMINOPHEN 325 MG/1
325 TABLET ORAL EVERY 4 HOURS PRN
COMMUNITY

## 2023-06-14 RX ORDER — ACETAMINOPHEN 650 MG/1
650 SUPPOSITORY RECTAL EVERY 6 HOURS PRN
Status: DISCONTINUED | OUTPATIENT
Start: 2023-06-14 | End: 2023-06-21 | Stop reason: HOSPADM

## 2023-06-14 RX ORDER — HYDROXYZINE 50 MG/1
50 TABLET, FILM COATED ORAL 3 TIMES DAILY PRN
COMMUNITY

## 2023-06-14 RX ORDER — PROPRANOLOL HYDROCHLORIDE 40 MG/1
40 TABLET ORAL 2 TIMES DAILY
Status: ON HOLD | COMMUNITY
Start: 2023-06-01 | End: 2023-06-21 | Stop reason: HOSPADM

## 2023-06-14 RX ORDER — FAMOTIDINE 20 MG/1
20 TABLET, FILM COATED ORAL 2 TIMES DAILY
Status: DISCONTINUED | OUTPATIENT
Start: 2023-06-14 | End: 2023-06-21 | Stop reason: HOSPADM

## 2023-06-14 RX ORDER — 0.9 % SODIUM CHLORIDE 0.9 %
1000 INTRAVENOUS SOLUTION INTRAVENOUS ONCE
Status: COMPLETED | OUTPATIENT
Start: 2023-06-14 | End: 2023-06-14

## 2023-06-14 RX ORDER — ENOXAPARIN SODIUM 100 MG/ML
30 INJECTION SUBCUTANEOUS EVERY 12 HOURS
Status: DISCONTINUED | OUTPATIENT
Start: 2023-06-14 | End: 2023-06-21 | Stop reason: HOSPADM

## 2023-06-14 RX ORDER — LITHIUM CARBONATE 450 MG
450 TABLET, EXTENDED RELEASE ORAL 2 TIMES DAILY
Status: ON HOLD | COMMUNITY
End: 2023-06-21 | Stop reason: HOSPADM

## 2023-06-14 RX ORDER — SODIUM CHLORIDE 9 MG/ML
INJECTION, SOLUTION INTRAVENOUS CONTINUOUS
Status: DISCONTINUED | OUTPATIENT
Start: 2023-06-14 | End: 2023-06-15

## 2023-06-14 RX ORDER — SODIUM CHLORIDE 9 MG/ML
INJECTION, SOLUTION INTRAVENOUS PRN
Status: DISCONTINUED | OUTPATIENT
Start: 2023-06-14 | End: 2023-06-21 | Stop reason: HOSPADM

## 2023-06-14 RX ORDER — ACETAMINOPHEN 325 MG/1
650 TABLET ORAL EVERY 6 HOURS PRN
Status: DISCONTINUED | OUTPATIENT
Start: 2023-06-14 | End: 2023-06-21 | Stop reason: HOSPADM

## 2023-06-14 RX ORDER — ONDANSETRON 4 MG/1
4 TABLET, ORALLY DISINTEGRATING ORAL EVERY 8 HOURS PRN
Status: DISCONTINUED | OUTPATIENT
Start: 2023-06-14 | End: 2023-06-21 | Stop reason: HOSPADM

## 2023-06-14 RX ADMIN — FAMOTIDINE 20 MG: 20 TABLET ORAL at 23:00

## 2023-06-14 RX ADMIN — ENOXAPARIN SODIUM 30 MG: 100 INJECTION SUBCUTANEOUS at 23:00

## 2023-06-14 RX ADMIN — SODIUM CHLORIDE 1000 ML: 9 INJECTION, SOLUTION INTRAVENOUS at 16:28

## 2023-06-14 RX ADMIN — SODIUM CHLORIDE: 9 INJECTION, SOLUTION INTRAVENOUS at 17:48

## 2023-06-14 ASSESSMENT — ENCOUNTER SYMPTOMS
VOMITING: 0
COUGH: 0
SHORTNESS OF BREATH: 1
NAUSEA: 0

## 2023-06-14 ASSESSMENT — PAIN - FUNCTIONAL ASSESSMENT
PAIN_FUNCTIONAL_ASSESSMENT: NONE - DENIES PAIN
PAIN_FUNCTIONAL_ASSESSMENT: NONE - DENIES PAIN

## 2023-06-14 ASSESSMENT — LIFESTYLE VARIABLES: HOW OFTEN DO YOU HAVE A DRINK CONTAINING ALCOHOL: NEVER

## 2023-06-15 ENCOUNTER — APPOINTMENT (OUTPATIENT)
Dept: GENERAL RADIOLOGY | Age: 48
DRG: 291 | End: 2023-06-15
Payer: MEDICARE

## 2023-06-15 PROBLEM — T56.891A LITHIUM TOXICITY: Status: ACTIVE | Noted: 2023-06-15

## 2023-06-15 PROBLEM — I50.32 HYPERTENSIVE HEART DISEASE WITH CHRONIC DIASTOLIC CONGESTIVE HEART FAILURE (HCC): Status: ACTIVE | Noted: 2022-12-02

## 2023-06-15 PROBLEM — I11.0 HYPERTENSIVE HEART DISEASE WITH CHRONIC DIASTOLIC CONGESTIVE HEART FAILURE (HCC): Status: ACTIVE | Noted: 2022-12-02

## 2023-06-15 LAB
ARTERIAL PATENCY WRIST A: POSITIVE
BASE EXCESS BLDA CALC-SCNC: 6.1 MMOL/L (ref -2.5–2.5)
BASE EXCESS BLDA CALC-SCNC: 7 MMOL/L (ref -2.5–2.5)
BASE EXCESS BLDA CALC-SCNC: 8.3 MMOL/L (ref -2.5–2.5)
BASOPHILS ABSOLUTE: 0 THOU/MM3 (ref 0–0.1)
BASOPHILS NFR BLD AUTO: 0.4 %
BDY SITE: ABNORMAL
BDY SITE: ABNORMAL
BREATHS SETTING VENT: 16 BPM
COLLECTED BY:: ABNORMAL
DEPRECATED RDW RBC AUTO: 58.7 FL (ref 35–45)
DEVICE: ABNORMAL
EOSINOPHIL NFR BLD AUTO: 1.4 %
EOSINOPHILS ABSOLUTE: 0.2 THOU/MM3 (ref 0–0.4)
ERYTHROCYTE [DISTWIDTH] IN BLOOD BY AUTOMATED COUNT: 17.8 % (ref 11.5–14.5)
FIO2 ON VENT O2 ANALYZER: 3 %
FIO2 ON VENT O2 ANALYZER: 40 %
FIO2 ON VENT O2 ANALYZER: 40 %
HCO3 BLDA-SCNC: 38 MMOL/L (ref 23–28)
HCO3 BLDA-SCNC: 41 MMOL/L (ref 23–28)
HCO3 BLDA-SCNC: 42 MMOL/L (ref 23–28)
HCT VFR BLD AUTO: 60.8 % (ref 42–52)
HGB BLD-MCNC: 17.4 GM/DL (ref 14–18)
HYPOCHROMIA BLD QL SMEAR: PRESENT
IMM GRANULOCYTES # BLD AUTO: 0.04 THOU/MM3 (ref 0–0.07)
IMM GRANULOCYTES NFR BLD AUTO: 0.3 %
LITHIUM SERPL-SCNC: 1.25 MMOL/L (ref 0.6–1.2)
LV EF: 63 %
LVEF MODALITY: NORMAL
LYMPHOCYTES ABSOLUTE: 2 THOU/MM3 (ref 1–4.8)
LYMPHOCYTES NFR BLD AUTO: 17 %
MCH RBC QN AUTO: 27.8 PG (ref 26–33)
MCHC RBC AUTO-ENTMCNC: 28.6 GM/DL (ref 32.2–35.5)
MCV RBC AUTO: 97 FL (ref 80–94)
MONOCYTES ABSOLUTE: 1.5 THOU/MM3 (ref 0.4–1.3)
MONOCYTES NFR BLD AUTO: 12.7 %
NEUTROPHILS NFR BLD AUTO: 68.2 %
NRBC BLD AUTO-RTO: 0 /100 WBC
NT-PROBNP SERPL IA-MCNC: 1994 PG/ML (ref 0–124)
PCO2 BLDA: 74 MMHG (ref 35–45)
PCO2 BLDA: 91 MMHG (ref 35–45)
PCO2 BLDA: 95 MMHG (ref 35–45)
PEEP SETTING VENT: 8 MMHG
PEEP SETTING VENT: 8 MMHG
PH BLDA: 7.25 [PH] (ref 7.35–7.45)
PH BLDA: 7.28 [PH] (ref 7.35–7.45)
PH BLDA: 7.31 [PH] (ref 7.35–7.45)
PIP: 16 CMH2O
PIP: 20 CMH2O
PLATELET # BLD AUTO: 152 THOU/MM3 (ref 130–400)
PLATELET BLD QL SMEAR: ADEQUATE
PMV BLD AUTO: 11.2 FL (ref 9.4–12.4)
PO2 BLDA: 62 MMHG (ref 71–104)
PO2 BLDA: 71 MMHG (ref 71–104)
PO2 BLDA: 73 MMHG (ref 71–104)
PROCALCITONIN SERPL IA-MCNC: 0.08 NG/ML (ref 0.01–0.09)
RBC # BLD AUTO: 6.27 MILL/MM3 (ref 4.7–6.1)
SAO2 % BLDA: 85 %
SAO2 % BLDA: 90 %
SAO2 % BLDA: 92 %
SCAN OF BLOOD SMEAR: NORMAL
SEGMENTED NEUTROPHILS ABSOLUTE COUNT: 8.1 THOU/MM3 (ref 1.8–7.7)
VENTILATION MODE VENT: ABNORMAL
VENTILATION MODE VENT: ABNORMAL
WBC # BLD AUTO: 11.9 THOU/MM3 (ref 4.8–10.8)

## 2023-06-15 PROCEDURE — 36600 WITHDRAWAL OF ARTERIAL BLOOD: CPT

## 2023-06-15 PROCEDURE — 6360000002 HC RX W HCPCS: Performed by: STUDENT IN AN ORGANIZED HEALTH CARE EDUCATION/TRAINING PROGRAM

## 2023-06-15 PROCEDURE — 99222 1ST HOSP IP/OBS MODERATE 55: CPT | Performed by: PHYSICIAN ASSISTANT

## 2023-06-15 PROCEDURE — 93005 ELECTROCARDIOGRAM TRACING: CPT | Performed by: PHYSICIAN ASSISTANT

## 2023-06-15 PROCEDURE — 2580000003 HC RX 258: Performed by: STUDENT IN AN ORGANIZED HEALTH CARE EDUCATION/TRAINING PROGRAM

## 2023-06-15 PROCEDURE — 84145 PROCALCITONIN (PCT): CPT

## 2023-06-15 PROCEDURE — 94640 AIRWAY INHALATION TREATMENT: CPT

## 2023-06-15 PROCEDURE — 99233 SBSQ HOSP IP/OBS HIGH 50: CPT | Performed by: NURSE PRACTITIONER

## 2023-06-15 PROCEDURE — 5A09457 ASSISTANCE WITH RESPIRATORY VENTILATION, 24-96 CONSECUTIVE HOURS, CONTINUOUS POSITIVE AIRWAY PRESSURE: ICD-10-PCS | Performed by: INTERNAL MEDICINE

## 2023-06-15 PROCEDURE — 37799 UNLISTED PX VASCULAR SURGERY: CPT

## 2023-06-15 PROCEDURE — 6370000000 HC RX 637 (ALT 250 FOR IP): Performed by: PHYSICIAN ASSISTANT

## 2023-06-15 PROCEDURE — 93306 TTE W/DOPPLER COMPLETE: CPT

## 2023-06-15 PROCEDURE — 6360000002 HC RX W HCPCS: Performed by: NURSE PRACTITIONER

## 2023-06-15 PROCEDURE — 82803 BLOOD GASES ANY COMBINATION: CPT

## 2023-06-15 PROCEDURE — 80178 ASSAY OF LITHIUM: CPT

## 2023-06-15 PROCEDURE — 2060000000 HC ICU INTERMEDIATE R&B

## 2023-06-15 PROCEDURE — 71045 X-RAY EXAM CHEST 1 VIEW: CPT

## 2023-06-15 PROCEDURE — 6370000000 HC RX 637 (ALT 250 FOR IP): Performed by: STUDENT IN AN ORGANIZED HEALTH CARE EDUCATION/TRAINING PROGRAM

## 2023-06-15 PROCEDURE — 94660 CPAP INITIATION&MGMT: CPT

## 2023-06-15 PROCEDURE — 2580000003 HC RX 258: Performed by: FAMILY MEDICINE

## 2023-06-15 PROCEDURE — 6370000000 HC RX 637 (ALT 250 FOR IP): Performed by: NURSE PRACTITIONER

## 2023-06-15 PROCEDURE — 83880 ASSAY OF NATRIURETIC PEPTIDE: CPT

## 2023-06-15 PROCEDURE — 90792 PSYCH DIAG EVAL W/MED SRVCS: CPT | Performed by: PSYCHIATRY & NEUROLOGY

## 2023-06-15 PROCEDURE — 36415 COLL VENOUS BLD VENIPUNCTURE: CPT

## 2023-06-15 PROCEDURE — 93010 ELECTROCARDIOGRAM REPORT: CPT | Performed by: NUCLEAR MEDICINE

## 2023-06-15 RX ORDER — PROPRANOLOL HYDROCHLORIDE 40 MG/1
40 TABLET ORAL 2 TIMES DAILY
Status: DISCONTINUED | OUTPATIENT
Start: 2023-06-15 | End: 2023-06-20

## 2023-06-15 RX ORDER — LISINOPRIL 5 MG/1
5 TABLET ORAL DAILY
Status: DISCONTINUED | OUTPATIENT
Start: 2023-06-15 | End: 2023-06-17

## 2023-06-15 RX ORDER — IPRATROPIUM BROMIDE AND ALBUTEROL SULFATE 2.5; .5 MG/3ML; MG/3ML
1 SOLUTION RESPIRATORY (INHALATION)
Status: DISCONTINUED | OUTPATIENT
Start: 2023-06-15 | End: 2023-06-16

## 2023-06-15 RX ORDER — NICOTINE 21 MG/24HR
1 PATCH, TRANSDERMAL 24 HOURS TRANSDERMAL DAILY
Status: DISCONTINUED | OUTPATIENT
Start: 2023-06-15 | End: 2023-06-21 | Stop reason: HOSPADM

## 2023-06-15 RX ORDER — HYDROXYZINE HYDROCHLORIDE 25 MG/1
50 TABLET, FILM COATED ORAL 3 TIMES DAILY PRN
Status: DISCONTINUED | OUTPATIENT
Start: 2023-06-15 | End: 2023-06-21 | Stop reason: HOSPADM

## 2023-06-15 RX ORDER — HYDROCHLOROTHIAZIDE 12.5 MG/1
12.5 CAPSULE, GELATIN COATED ORAL DAILY
Status: DISCONTINUED | OUTPATIENT
Start: 2023-06-15 | End: 2023-06-19

## 2023-06-15 RX ORDER — FUROSEMIDE 10 MG/ML
20 INJECTION INTRAMUSCULAR; INTRAVENOUS 2 TIMES DAILY
Status: DISCONTINUED | OUTPATIENT
Start: 2023-06-16 | End: 2023-06-16

## 2023-06-15 RX ORDER — LURASIDONE HYDROCHLORIDE 40 MG/1
120 TABLET, FILM COATED ORAL DAILY
Status: DISCONTINUED | OUTPATIENT
Start: 2023-06-15 | End: 2023-06-21 | Stop reason: HOSPADM

## 2023-06-15 RX ORDER — FUROSEMIDE 10 MG/ML
40 INJECTION INTRAMUSCULAR; INTRAVENOUS ONCE
Status: COMPLETED | OUTPATIENT
Start: 2023-06-15 | End: 2023-06-15

## 2023-06-15 RX ORDER — FUROSEMIDE 10 MG/ML
20 INJECTION INTRAMUSCULAR; INTRAVENOUS ONCE
Status: COMPLETED | OUTPATIENT
Start: 2023-06-15 | End: 2023-06-15

## 2023-06-15 RX ADMIN — IPRATROPIUM BROMIDE AND ALBUTEROL SULFATE 1 DOSE: .5; 3 SOLUTION RESPIRATORY (INHALATION) at 17:22

## 2023-06-15 RX ADMIN — PROPRANOLOL HYDROCHLORIDE 40 MG: 40 TABLET ORAL at 22:10

## 2023-06-15 RX ADMIN — SODIUM CHLORIDE, PRESERVATIVE FREE 5 ML: 5 INJECTION INTRAVENOUS at 01:58

## 2023-06-15 RX ADMIN — DOCUSATE SODIUM 50 MG AND SENNOSIDES 8.6 MG 2 TABLET: 8.6; 5 TABLET, FILM COATED ORAL at 09:01

## 2023-06-15 RX ADMIN — FUROSEMIDE 40 MG: 10 INJECTION, SOLUTION INTRAMUSCULAR; INTRAVENOUS at 10:19

## 2023-06-15 RX ADMIN — HYDROXYZINE HYDROCHLORIDE 50 MG: 25 TABLET, FILM COATED ORAL at 09:02

## 2023-06-15 RX ADMIN — FAMOTIDINE 20 MG: 20 TABLET ORAL at 22:10

## 2023-06-15 RX ADMIN — SODIUM CHLORIDE: 9 INJECTION, SOLUTION INTRAVENOUS at 06:28

## 2023-06-15 RX ADMIN — SODIUM CHLORIDE, PRESERVATIVE FREE 10 ML: 5 INJECTION INTRAVENOUS at 20:06

## 2023-06-15 RX ADMIN — IPRATROPIUM BROMIDE AND ALBUTEROL SULFATE 1 DOSE: .5; 3 SOLUTION RESPIRATORY (INHALATION) at 19:49

## 2023-06-15 RX ADMIN — LURASIDONE HYDROCHLORIDE 120 MG: 40 TABLET, FILM COATED ORAL at 09:01

## 2023-06-15 RX ADMIN — HYDROXYZINE HYDROCHLORIDE 50 MG: 25 TABLET, FILM COATED ORAL at 17:31

## 2023-06-15 RX ADMIN — FUROSEMIDE 20 MG: 10 INJECTION, SOLUTION INTRAMUSCULAR; INTRAVENOUS at 18:50

## 2023-06-15 RX ADMIN — FAMOTIDINE 20 MG: 20 TABLET ORAL at 09:01

## 2023-06-15 RX ADMIN — ENOXAPARIN SODIUM 30 MG: 100 INJECTION SUBCUTANEOUS at 22:30

## 2023-06-15 RX ADMIN — ENOXAPARIN SODIUM 30 MG: 100 INJECTION SUBCUTANEOUS at 10:20

## 2023-06-15 ASSESSMENT — ENCOUNTER SYMPTOMS
GASTROINTESTINAL NEGATIVE: 1
ALLERGIC/IMMUNOLOGIC NEGATIVE: 1
SHORTNESS OF BREATH: 1
EYES NEGATIVE: 1

## 2023-06-15 ASSESSMENT — PAIN SCALES - GENERAL: PAINLEVEL_OUTOF10: 0

## 2023-06-16 LAB
ANION GAP SERPL CALC-SCNC: 7 MEQ/L (ref 8–16)
ARTERIAL PATENCY WRIST A: POSITIVE
BASE EXCESS BLDA CALC-SCNC: 10.7 MMOL/L (ref -2.5–2.5)
BASE EXCESS BLDA CALC-SCNC: 6.3 MMOL/L (ref -2.5–2.5)
BASE EXCESS BLDA CALC-SCNC: 8.3 MMOL/L (ref -2.5–2.5)
BDY SITE: ABNORMAL
BUN SERPL-MCNC: 16 MG/DL (ref 7–22)
CALCIUM SERPL-MCNC: 8.8 MG/DL (ref 8.5–10.5)
CHLORIDE SERPL-SCNC: 94 MEQ/L (ref 98–111)
CO2 SERPL-SCNC: 37 MEQ/L (ref 23–33)
COLLECTED BY:: ABNORMAL
CREAT SERPL-MCNC: 1.3 MG/DL (ref 0.4–1.2)
DEPRECATED RDW RBC AUTO: 58.3 FL (ref 35–45)
DEVICE: ABNORMAL
ERYTHROCYTE [DISTWIDTH] IN BLOOD BY AUTOMATED COUNT: 17.9 % (ref 11.5–14.5)
FIO2 ON VENT O2 ANALYZER: 28 %
FIO2 ON VENT O2 ANALYZER: 30 %
FIO2 ON VENT O2 ANALYZER: 4 %
GFR SERPL CREATININE-BSD FRML MDRD: > 60 ML/MIN/1.73M2
GLUCOSE BLD STRIP.AUTO-MCNC: 89 MG/DL (ref 70–108)
GLUCOSE SERPL-MCNC: 99 MG/DL (ref 70–108)
HCO3 BLDA-SCNC: 41 MMOL/L (ref 23–28)
HCO3 BLDA-SCNC: 43 MMOL/L (ref 23–28)
HCO3 BLDA-SCNC: 45 MMOL/L (ref 23–28)
HCT VFR BLD AUTO: 63.9 % (ref 42–52)
HGB BLD-MCNC: 17.8 GM/DL (ref 14–18)
LITHIUM SERPL-SCNC: 0.96 MMOL/L (ref 0.6–1.2)
MCH RBC QN AUTO: 27 PG (ref 26–33)
MCHC RBC AUTO-ENTMCNC: 27.9 GM/DL (ref 32.2–35.5)
MCV RBC AUTO: 97 FL (ref 80–94)
MRSA DNA SPEC QL NAA+PROBE: NEGATIVE
PCO2 BLDA: 109 MMHG (ref 35–45)
PCO2 BLDA: 86 MMHG (ref 35–45)
PCO2 BLDA: 93 MMHG (ref 35–45)
PH BLDA: 7.2 [PH] (ref 7.35–7.45)
PH BLDA: 7.29 [PH] (ref 7.35–7.45)
PH BLDA: 7.29 [PH] (ref 7.35–7.45)
PLATELET # BLD AUTO: 163 THOU/MM3 (ref 130–400)
PMV BLD AUTO: 10.9 FL (ref 9.4–12.4)
PO2 BLDA: 57 MMHG (ref 71–104)
PO2 BLDA: 66 MMHG (ref 71–104)
PO2 BLDA: 73 MMHG (ref 71–104)
POTASSIUM SERPL-SCNC: 4.9 MEQ/L (ref 3.5–5.2)
RBC # BLD AUTO: 6.59 MILL/MM3 (ref 4.7–6.1)
SAO2 % BLDA: 83 %
SAO2 % BLDA: 89 %
SAO2 % BLDA: 89 %
SODIUM SERPL-SCNC: 138 MEQ/L (ref 135–145)
T4 FREE SERPL-MCNC: 1.07 NG/DL (ref 0.93–1.76)
TSH SERPL DL<=0.005 MIU/L-ACNC: 0.83 UIU/ML (ref 0.4–4.2)
WBC # BLD AUTO: 12.3 THOU/MM3 (ref 4.8–10.8)

## 2023-06-16 PROCEDURE — 94761 N-INVAS EAR/PLS OXIMETRY MLT: CPT

## 2023-06-16 PROCEDURE — 82948 REAGENT STRIP/BLOOD GLUCOSE: CPT

## 2023-06-16 PROCEDURE — 84439 ASSAY OF FREE THYROXINE: CPT

## 2023-06-16 PROCEDURE — 87641 MR-STAPH DNA AMP PROBE: CPT

## 2023-06-16 PROCEDURE — 82803 BLOOD GASES ANY COMBINATION: CPT

## 2023-06-16 PROCEDURE — 6360000002 HC RX W HCPCS: Performed by: STUDENT IN AN ORGANIZED HEALTH CARE EDUCATION/TRAINING PROGRAM

## 2023-06-16 PROCEDURE — 2700000000 HC OXYGEN THERAPY PER DAY

## 2023-06-16 PROCEDURE — 1200000000 HC SEMI PRIVATE

## 2023-06-16 PROCEDURE — 6370000000 HC RX 637 (ALT 250 FOR IP): Performed by: PHYSICIAN ASSISTANT

## 2023-06-16 PROCEDURE — 84443 ASSAY THYROID STIM HORMONE: CPT

## 2023-06-16 PROCEDURE — 94640 AIRWAY INHALATION TREATMENT: CPT

## 2023-06-16 PROCEDURE — 6360000002 HC RX W HCPCS: Performed by: NURSE PRACTITIONER

## 2023-06-16 PROCEDURE — 99233 SBSQ HOSP IP/OBS HIGH 50: CPT | Performed by: NURSE PRACTITIONER

## 2023-06-16 PROCEDURE — 87070 CULTURE OTHR SPECIMN AEROBIC: CPT

## 2023-06-16 PROCEDURE — 2500000003 HC RX 250 WO HCPCS

## 2023-06-16 PROCEDURE — 94660 CPAP INITIATION&MGMT: CPT

## 2023-06-16 PROCEDURE — 36415 COLL VENOUS BLD VENIPUNCTURE: CPT

## 2023-06-16 PROCEDURE — 85027 COMPLETE CBC AUTOMATED: CPT

## 2023-06-16 PROCEDURE — 2580000003 HC RX 258: Performed by: STUDENT IN AN ORGANIZED HEALTH CARE EDUCATION/TRAINING PROGRAM

## 2023-06-16 PROCEDURE — 99291 CRITICAL CARE FIRST HOUR: CPT | Performed by: INTERNAL MEDICINE

## 2023-06-16 PROCEDURE — 36600 WITHDRAWAL OF ARTERIAL BLOOD: CPT

## 2023-06-16 PROCEDURE — 6370000000 HC RX 637 (ALT 250 FOR IP): Performed by: NURSE PRACTITIONER

## 2023-06-16 PROCEDURE — 6370000000 HC RX 637 (ALT 250 FOR IP): Performed by: STUDENT IN AN ORGANIZED HEALTH CARE EDUCATION/TRAINING PROGRAM

## 2023-06-16 PROCEDURE — 80178 ASSAY OF LITHIUM: CPT

## 2023-06-16 PROCEDURE — 80048 BASIC METABOLIC PNL TOTAL CA: CPT

## 2023-06-16 RX ORDER — ALBUTEROL SULFATE 2.5 MG/3ML
2.5 SOLUTION RESPIRATORY (INHALATION) EVERY 6 HOURS PRN
Status: DISCONTINUED | OUTPATIENT
Start: 2023-06-16 | End: 2023-06-16

## 2023-06-16 RX ORDER — BUMETANIDE 0.25 MG/ML
2 INJECTION INTRAMUSCULAR; INTRAVENOUS ONCE
Status: COMPLETED | OUTPATIENT
Start: 2023-06-16 | End: 2023-06-16

## 2023-06-16 RX ADMIN — SODIUM CHLORIDE, PRESERVATIVE FREE 10 ML: 5 INJECTION INTRAVENOUS at 20:26

## 2023-06-16 RX ADMIN — SODIUM CHLORIDE, PRESERVATIVE FREE 10 ML: 5 INJECTION INTRAVENOUS at 09:59

## 2023-06-16 RX ADMIN — FUROSEMIDE 20 MG: 10 INJECTION, SOLUTION INTRAMUSCULAR; INTRAVENOUS at 09:47

## 2023-06-16 RX ADMIN — IPRATROPIUM BROMIDE AND ALBUTEROL SULFATE 1 DOSE: .5; 3 SOLUTION RESPIRATORY (INHALATION) at 08:30

## 2023-06-16 RX ADMIN — FAMOTIDINE 20 MG: 20 TABLET ORAL at 20:25

## 2023-06-16 RX ADMIN — HYDROXYZINE HYDROCHLORIDE 50 MG: 25 TABLET, FILM COATED ORAL at 22:48

## 2023-06-16 RX ADMIN — ENOXAPARIN SODIUM 30 MG: 100 INJECTION SUBCUTANEOUS at 22:07

## 2023-06-16 RX ADMIN — ENOXAPARIN SODIUM 30 MG: 100 INJECTION SUBCUTANEOUS at 10:37

## 2023-06-16 RX ADMIN — BUMETANIDE 2 MG: 0.25 INJECTION INTRAMUSCULAR; INTRAVENOUS at 17:21

## 2023-06-16 ASSESSMENT — PAIN SCALES - GENERAL: PAINLEVEL_OUTOF10: 0

## 2023-06-17 LAB
ANION GAP SERPL CALC-SCNC: 8 MEQ/L (ref 8–16)
ARTERIAL PATENCY WRIST A: POSITIVE
BASE EXCESS BLDA CALC-SCNC: 10.7 MMOL/L (ref -2.5–2.5)
BASE EXCESS BLDA CALC-SCNC: 11.8 MMOL/L (ref -2.5–2.5)
BASE EXCESS BLDA CALC-SCNC: 12.3 MMOL/L (ref -2.5–2.5)
BDY SITE: ABNORMAL
BREATHS SETTING VENT: 12 BPM
BREATHS SETTING VENT: 14 BPM
BUN SERPL-MCNC: 14 MG/DL (ref 7–22)
CALCIUM SERPL-MCNC: 9.2 MG/DL (ref 8.5–10.5)
CHLORIDE SERPL-SCNC: 94 MEQ/L (ref 98–111)
CO2 SERPL-SCNC: 39 MEQ/L (ref 23–33)
COLLECTED BY:: ABNORMAL
CREAT SERPL-MCNC: 1.1 MG/DL (ref 0.4–1.2)
DEPRECATED RDW RBC AUTO: 56.6 FL (ref 35–45)
DEVICE: ABNORMAL
ERYTHROCYTE [DISTWIDTH] IN BLOOD BY AUTOMATED COUNT: 18 % (ref 11.5–14.5)
FIO2 ON VENT O2 ANALYZER: 4 %
FIO2 ON VENT O2 ANALYZER: 40 %
FIO2 ON VENT O2 ANALYZER: 50 %
GFR SERPL CREATININE-BSD FRML MDRD: > 60 ML/MIN/1.73M2
GLUCOSE SERPL-MCNC: 100 MG/DL (ref 70–108)
HCO3 BLDA-SCNC: 45 MMOL/L (ref 23–28)
HCO3 BLDA-SCNC: 46 MMOL/L (ref 23–28)
HCO3 BLDA-SCNC: 46 MMOL/L (ref 23–28)
HCT VFR BLD AUTO: 64.7 % (ref 42–52)
HGB BLD-MCNC: 18.4 GM/DL (ref 14–18)
MCH RBC QN AUTO: 27.1 PG (ref 26–33)
MCHC RBC AUTO-ENTMCNC: 28.4 GM/DL (ref 32.2–35.5)
MCV RBC AUTO: 95.4 FL (ref 80–94)
PCO2 BLDA: 102 MMHG (ref 35–45)
PCO2 BLDA: 83 MMHG (ref 35–45)
PCO2 BLDA: 94 MMHG (ref 35–45)
PEEP SETTING VENT: 10 MMHG
PEEP SETTING VENT: 8 MMHG
PH BLDA: 7.27 [PH] (ref 7.35–7.45)
PH BLDA: 7.3 [PH] (ref 7.35–7.45)
PH BLDA: 7.34 [PH] (ref 7.35–7.45)
PLATELET # BLD AUTO: 167 THOU/MM3 (ref 130–400)
PMV BLD AUTO: 11.2 FL (ref 9.4–12.4)
PO2 BLDA: 101 MMHG (ref 71–104)
PO2 BLDA: 75 MMHG (ref 71–104)
PO2 BLDA: 76 MMHG (ref 71–104)
POTASSIUM SERPL-SCNC: 4.7 MEQ/L (ref 3.5–5.2)
PRESSURE SUPPORT SETTING VENT: 14 CMH2O
PRESSURE SUPPORT SETTING VENT: 14 CMH2O
RBC # BLD AUTO: 6.78 MILL/MM3 (ref 4.7–6.1)
SAO2 % BLDA: 91 %
SAO2 % BLDA: 93 %
SAO2 % BLDA: 96 %
SODIUM SERPL-SCNC: 141 MEQ/L (ref 135–145)
WBC # BLD AUTO: 11.3 THOU/MM3 (ref 4.8–10.8)

## 2023-06-17 PROCEDURE — 82803 BLOOD GASES ANY COMBINATION: CPT

## 2023-06-17 PROCEDURE — 6360000002 HC RX W HCPCS: Performed by: NURSE PRACTITIONER

## 2023-06-17 PROCEDURE — 85027 COMPLETE CBC AUTOMATED: CPT

## 2023-06-17 PROCEDURE — 6360000002 HC RX W HCPCS: Performed by: STUDENT IN AN ORGANIZED HEALTH CARE EDUCATION/TRAINING PROGRAM

## 2023-06-17 PROCEDURE — 94660 CPAP INITIATION&MGMT: CPT

## 2023-06-17 PROCEDURE — 2580000003 HC RX 258: Performed by: STUDENT IN AN ORGANIZED HEALTH CARE EDUCATION/TRAINING PROGRAM

## 2023-06-17 PROCEDURE — 94669 MECHANICAL CHEST WALL OSCILL: CPT

## 2023-06-17 PROCEDURE — 80048 BASIC METABOLIC PNL TOTAL CA: CPT

## 2023-06-17 PROCEDURE — 6370000000 HC RX 637 (ALT 250 FOR IP): Performed by: PHYSICIAN ASSISTANT

## 2023-06-17 PROCEDURE — 6370000000 HC RX 637 (ALT 250 FOR IP): Performed by: NURSE PRACTITIONER

## 2023-06-17 PROCEDURE — 94761 N-INVAS EAR/PLS OXIMETRY MLT: CPT

## 2023-06-17 PROCEDURE — 99222 1ST HOSP IP/OBS MODERATE 55: CPT | Performed by: NURSE PRACTITIONER

## 2023-06-17 PROCEDURE — 6370000000 HC RX 637 (ALT 250 FOR IP): Performed by: STUDENT IN AN ORGANIZED HEALTH CARE EDUCATION/TRAINING PROGRAM

## 2023-06-17 PROCEDURE — 36415 COLL VENOUS BLD VENIPUNCTURE: CPT

## 2023-06-17 PROCEDURE — 2700000000 HC OXYGEN THERAPY PER DAY

## 2023-06-17 PROCEDURE — 2140000000 HC CCU INTERMEDIATE R&B

## 2023-06-17 PROCEDURE — 99233 SBSQ HOSP IP/OBS HIGH 50: CPT | Performed by: NURSE PRACTITIONER

## 2023-06-17 PROCEDURE — 36600 WITHDRAWAL OF ARTERIAL BLOOD: CPT

## 2023-06-17 PROCEDURE — 94640 AIRWAY INHALATION TREATMENT: CPT

## 2023-06-17 RX ORDER — IPRATROPIUM BROMIDE AND ALBUTEROL SULFATE 2.5; .5 MG/3ML; MG/3ML
1 SOLUTION RESPIRATORY (INHALATION)
Status: DISCONTINUED | OUTPATIENT
Start: 2023-06-17 | End: 2023-06-21 | Stop reason: HOSPADM

## 2023-06-17 RX ORDER — METHYLPREDNISOLONE SODIUM SUCCINATE 40 MG/ML
40 INJECTION, POWDER, LYOPHILIZED, FOR SOLUTION INTRAMUSCULAR; INTRAVENOUS EVERY 12 HOURS
Status: COMPLETED | OUTPATIENT
Start: 2023-06-17 | End: 2023-06-18

## 2023-06-17 RX ORDER — LANOLIN ALCOHOL/MO/W.PET/CERES
3 CREAM (GRAM) TOPICAL NIGHTLY PRN
Status: DISCONTINUED | OUTPATIENT
Start: 2023-06-17 | End: 2023-06-21 | Stop reason: HOSPADM

## 2023-06-17 RX ORDER — LISINOPRIL 5 MG/1
5 TABLET ORAL DAILY
Status: DISCONTINUED | OUTPATIENT
Start: 2023-06-17 | End: 2023-06-21 | Stop reason: HOSPADM

## 2023-06-17 RX ADMIN — FAMOTIDINE 20 MG: 20 TABLET ORAL at 09:15

## 2023-06-17 RX ADMIN — METHYLPREDNISOLONE SODIUM SUCCINATE 40 MG: 40 INJECTION INTRAMUSCULAR; INTRAVENOUS at 13:16

## 2023-06-17 RX ADMIN — DOCUSATE SODIUM 50 MG AND SENNOSIDES 8.6 MG 2 TABLET: 8.6; 5 TABLET, FILM COATED ORAL at 09:20

## 2023-06-17 RX ADMIN — HYDROXYZINE HYDROCHLORIDE 50 MG: 25 TABLET, FILM COATED ORAL at 21:24

## 2023-06-17 RX ADMIN — METHYLPREDNISOLONE SODIUM SUCCINATE 40 MG: 40 INJECTION INTRAMUSCULAR; INTRAVENOUS at 22:46

## 2023-06-17 RX ADMIN — SODIUM CHLORIDE, PRESERVATIVE FREE 10 ML: 5 INJECTION INTRAVENOUS at 09:06

## 2023-06-17 RX ADMIN — SODIUM CHLORIDE, PRESERVATIVE FREE 10 ML: 5 INJECTION INTRAVENOUS at 13:19

## 2023-06-17 RX ADMIN — ENOXAPARIN SODIUM 30 MG: 100 INJECTION SUBCUTANEOUS at 12:23

## 2023-06-17 RX ADMIN — IPRATROPIUM BROMIDE AND ALBUTEROL SULFATE 1 DOSE: .5; 3 SOLUTION RESPIRATORY (INHALATION) at 21:30

## 2023-06-17 RX ADMIN — ACETAMINOPHEN 650 MG: 325 TABLET ORAL at 00:54

## 2023-06-17 RX ADMIN — Medication 3 MG: at 22:47

## 2023-06-17 RX ADMIN — FAMOTIDINE 20 MG: 20 TABLET ORAL at 21:24

## 2023-06-17 RX ADMIN — LURASIDONE HYDROCHLORIDE 120 MG: 40 TABLET, FILM COATED ORAL at 09:10

## 2023-06-17 RX ADMIN — LISINOPRIL 5 MG: 5 TABLET ORAL at 14:22

## 2023-06-17 RX ADMIN — IPRATROPIUM BROMIDE AND ALBUTEROL SULFATE 1 DOSE: .5; 3 SOLUTION RESPIRATORY (INHALATION) at 13:40

## 2023-06-17 ASSESSMENT — PAIN SCALES - GENERAL
PAINLEVEL_OUTOF10: 0

## 2023-06-18 LAB
ANION GAP SERPL CALC-SCNC: 10 MEQ/L (ref 8–16)
BACTERIA SPEC AEROBE CULT: NORMAL
BASE EXCESS BLDA CALC-SCNC: 12.4 MMOL/L (ref -2–3)
BASE EXCESS BLDA CALC-SCNC: 9.9 MMOL/L (ref -2.5–2.5)
BUN SERPL-MCNC: 15 MG/DL (ref 7–22)
CALCIUM SERPL-MCNC: 9.2 MG/DL (ref 8.5–10.5)
CHLORIDE SERPL-SCNC: 91 MEQ/L (ref 98–111)
CO2 SERPL-SCNC: 35 MEQ/L (ref 23–33)
COLLECTED BY:: ABNORMAL
COLLECTED BY:: ABNORMAL
CREAT SERPL-MCNC: 1 MG/DL (ref 0.4–1.2)
CREAT SERPL-MCNC: 1.1 MG/DL (ref 0.4–1.2)
DEPRECATED RDW RBC AUTO: 54.1 FL (ref 35–45)
DEVICE: ABNORMAL
ERYTHROCYTE [DISTWIDTH] IN BLOOD BY AUTOMATED COUNT: 17.3 % (ref 11.5–14.5)
FIO2 ON VENT O2 ANALYZER: 30 %
GFR SERPL CREATININE-BSD FRML MDRD: > 60 ML/MIN/1.73M2
GFR SERPL CREATININE-BSD FRML MDRD: > 60 ML/MIN/1.73M2
GLUCOSE SERPL-MCNC: 129 MG/DL (ref 70–108)
HCO3 BLDA-SCNC: 42 MMOL/L (ref 23–28)
HCO3 BLDA-SCNC: 43 MMOL/L (ref 23–28)
HCT VFR BLD AUTO: 60.2 % (ref 42–52)
HGB BLD-MCNC: 17.6 GM/DL (ref 14–18)
MCH RBC QN AUTO: 27.2 PG (ref 26–33)
MCHC RBC AUTO-ENTMCNC: 29.2 GM/DL (ref 32.2–35.5)
MCV RBC AUTO: 93.2 FL (ref 80–94)
PCO2 BLDA: 83 MMHG (ref 35–45)
PCO2 TEMP ADJ BLDMV: 64 MMHG (ref 41–51)
PH BLDA: 7.32 [PH] (ref 7.35–7.45)
PH BLDMV: 7.43 [PH] (ref 7.31–7.41)
PIP: 24 CMH2O
PLATELET # BLD AUTO: 173 THOU/MM3 (ref 130–400)
PMV BLD AUTO: 10.7 FL (ref 9.4–12.4)
PO2 BLDA: 89 MMHG (ref 71–104)
PO2 BLDMV: 40 MMHG (ref 25–40)
POTASSIUM SERPL-SCNC: 5 MEQ/L (ref 3.5–5.2)
RBC # BLD AUTO: 6.46 MILL/MM3 (ref 4.7–6.1)
SAO2 % BLDA: 95 %
SAO2 % BLDMV: 73 %
SET PEEP: 10 MMHG
SODIUM SERPL-SCNC: 136 MEQ/L (ref 135–145)
WBC # BLD AUTO: 12.6 THOU/MM3 (ref 4.8–10.8)

## 2023-06-18 PROCEDURE — 82803 BLOOD GASES ANY COMBINATION: CPT

## 2023-06-18 PROCEDURE — 36415 COLL VENOUS BLD VENIPUNCTURE: CPT

## 2023-06-18 PROCEDURE — 82565 ASSAY OF CREATININE: CPT

## 2023-06-18 PROCEDURE — 94761 N-INVAS EAR/PLS OXIMETRY MLT: CPT

## 2023-06-18 PROCEDURE — 6360000002 HC RX W HCPCS: Performed by: STUDENT IN AN ORGANIZED HEALTH CARE EDUCATION/TRAINING PROGRAM

## 2023-06-18 PROCEDURE — 2700000000 HC OXYGEN THERAPY PER DAY

## 2023-06-18 PROCEDURE — 94640 AIRWAY INHALATION TREATMENT: CPT

## 2023-06-18 PROCEDURE — 6370000000 HC RX 637 (ALT 250 FOR IP): Performed by: STUDENT IN AN ORGANIZED HEALTH CARE EDUCATION/TRAINING PROGRAM

## 2023-06-18 PROCEDURE — 99232 SBSQ HOSP IP/OBS MODERATE 35: CPT | Performed by: NURSE PRACTITIONER

## 2023-06-18 PROCEDURE — 6370000000 HC RX 637 (ALT 250 FOR IP): Performed by: PHYSICIAN ASSISTANT

## 2023-06-18 PROCEDURE — 94660 CPAP INITIATION&MGMT: CPT

## 2023-06-18 PROCEDURE — 6370000000 HC RX 637 (ALT 250 FOR IP): Performed by: NURSE PRACTITIONER

## 2023-06-18 PROCEDURE — 2580000003 HC RX 258: Performed by: STUDENT IN AN ORGANIZED HEALTH CARE EDUCATION/TRAINING PROGRAM

## 2023-06-18 PROCEDURE — 85027 COMPLETE CBC AUTOMATED: CPT

## 2023-06-18 PROCEDURE — 2140000000 HC CCU INTERMEDIATE R&B

## 2023-06-18 PROCEDURE — 6360000002 HC RX W HCPCS: Performed by: NURSE PRACTITIONER

## 2023-06-18 PROCEDURE — 80048 BASIC METABOLIC PNL TOTAL CA: CPT

## 2023-06-18 RX ORDER — PREDNISONE 20 MG/1
40 TABLET ORAL DAILY
Status: COMPLETED | OUTPATIENT
Start: 2023-06-19 | End: 2023-06-21

## 2023-06-18 RX ORDER — FUROSEMIDE 10 MG/ML
40 INJECTION INTRAMUSCULAR; INTRAVENOUS ONCE
Status: COMPLETED | OUTPATIENT
Start: 2023-06-18 | End: 2023-06-18

## 2023-06-18 RX ADMIN — SODIUM CHLORIDE, PRESERVATIVE FREE 10 ML: 5 INJECTION INTRAVENOUS at 11:54

## 2023-06-18 RX ADMIN — FUROSEMIDE 40 MG: 10 INJECTION, SOLUTION INTRAMUSCULAR; INTRAVENOUS at 11:46

## 2023-06-18 RX ADMIN — IPRATROPIUM BROMIDE AND ALBUTEROL SULFATE 1 DOSE: .5; 3 SOLUTION RESPIRATORY (INHALATION) at 07:36

## 2023-06-18 RX ADMIN — ENOXAPARIN SODIUM 30 MG: 100 INJECTION SUBCUTANEOUS at 11:49

## 2023-06-18 RX ADMIN — SODIUM CHLORIDE, PRESERVATIVE FREE 10 ML: 5 INJECTION INTRAVENOUS at 07:55

## 2023-06-18 RX ADMIN — LISINOPRIL 5 MG: 5 TABLET ORAL at 08:05

## 2023-06-18 RX ADMIN — IPRATROPIUM BROMIDE AND ALBUTEROL SULFATE 1 DOSE: .5; 3 SOLUTION RESPIRATORY (INHALATION) at 15:03

## 2023-06-18 RX ADMIN — Medication 3 MG: at 22:02

## 2023-06-18 RX ADMIN — FAMOTIDINE 20 MG: 20 TABLET ORAL at 22:02

## 2023-06-18 RX ADMIN — METHYLPREDNISOLONE SODIUM SUCCINATE 40 MG: 40 INJECTION INTRAMUSCULAR; INTRAVENOUS at 11:54

## 2023-06-18 RX ADMIN — FAMOTIDINE 20 MG: 20 TABLET ORAL at 08:02

## 2023-06-18 RX ADMIN — IPRATROPIUM BROMIDE AND ALBUTEROL SULFATE 1 DOSE: .5; 3 SOLUTION RESPIRATORY (INHALATION) at 19:49

## 2023-06-18 RX ADMIN — DOCUSATE SODIUM 50 MG AND SENNOSIDES 8.6 MG 2 TABLET: 8.6; 5 TABLET, FILM COATED ORAL at 08:02

## 2023-06-18 RX ADMIN — SODIUM CHLORIDE, PRESERVATIVE FREE 10 ML: 5 INJECTION INTRAVENOUS at 11:47

## 2023-06-18 RX ADMIN — IPRATROPIUM BROMIDE AND ALBUTEROL SULFATE 1 DOSE: .5; 3 SOLUTION RESPIRATORY (INHALATION) at 11:17

## 2023-06-18 RX ADMIN — LURASIDONE HYDROCHLORIDE 120 MG: 40 TABLET, FILM COATED ORAL at 08:06

## 2023-06-18 RX ADMIN — HYDROXYZINE HYDROCHLORIDE 50 MG: 25 TABLET, FILM COATED ORAL at 22:02

## 2023-06-18 ASSESSMENT — PAIN SCALES - GENERAL
PAINLEVEL_OUTOF10: 0

## 2023-06-19 ENCOUNTER — APPOINTMENT (OUTPATIENT)
Dept: CT IMAGING | Age: 48
DRG: 291 | End: 2023-06-19
Payer: MEDICARE

## 2023-06-19 PROBLEM — G47.30 SLEEP APNEA: Status: ACTIVE | Noted: 2023-06-19

## 2023-06-19 PROBLEM — I27.20 PULMONARY HTN (HCC): Status: ACTIVE | Noted: 2023-06-19

## 2023-06-19 PROBLEM — I50.33 ACUTE ON CHRONIC DIASTOLIC CONGESTIVE HEART FAILURE (HCC): Status: ACTIVE | Noted: 2023-06-19

## 2023-06-19 PROBLEM — J96.22 ACUTE ON CHRONIC RESPIRATORY FAILURE WITH HYPERCAPNIA (HCC): Status: ACTIVE | Noted: 2023-06-19

## 2023-06-19 LAB
ANION GAP SERPL CALC-SCNC: 8 MEQ/L (ref 8–16)
BUN SERPL-MCNC: 19 MG/DL (ref 7–22)
CALCIUM SERPL-MCNC: 9 MG/DL (ref 8.5–10.5)
CHLORIDE SERPL-SCNC: 95 MEQ/L (ref 98–111)
CO2 SERPL-SCNC: 36 MEQ/L (ref 23–33)
CREAT SERPL-MCNC: 1 MG/DL (ref 0.4–1.2)
DEPRECATED RDW RBC AUTO: 55.4 FL (ref 35–45)
ERYTHROCYTE [DISTWIDTH] IN BLOOD BY AUTOMATED COUNT: 17.6 % (ref 11.5–14.5)
GFR SERPL CREATININE-BSD FRML MDRD: > 60 ML/MIN/1.73M2
GLUCOSE SERPL-MCNC: 109 MG/DL (ref 70–108)
HCT VFR BLD AUTO: 58.6 % (ref 42–52)
HGB BLD-MCNC: 17.2 GM/DL (ref 14–18)
MCH RBC QN AUTO: 27.4 PG (ref 26–33)
MCHC RBC AUTO-ENTMCNC: 29.4 GM/DL (ref 32.2–35.5)
MCV RBC AUTO: 93.3 FL (ref 80–94)
PLATELET # BLD AUTO: 170 THOU/MM3 (ref 130–400)
PMV BLD AUTO: 10.9 FL (ref 9.4–12.4)
POTASSIUM SERPL-SCNC: 4 MEQ/L (ref 3.5–5.2)
RBC # BLD AUTO: 6.28 MILL/MM3 (ref 4.7–6.1)
SODIUM SERPL-SCNC: 139 MEQ/L (ref 135–145)
WBC # BLD AUTO: 12.6 THOU/MM3 (ref 4.8–10.8)

## 2023-06-19 PROCEDURE — 93010 ELECTROCARDIOGRAM REPORT: CPT | Performed by: INTERNAL MEDICINE

## 2023-06-19 PROCEDURE — 80048 BASIC METABOLIC PNL TOTAL CA: CPT

## 2023-06-19 PROCEDURE — 6370000000 HC RX 637 (ALT 250 FOR IP): Performed by: NURSE PRACTITIONER

## 2023-06-19 PROCEDURE — 6370000000 HC RX 637 (ALT 250 FOR IP): Performed by: STUDENT IN AN ORGANIZED HEALTH CARE EDUCATION/TRAINING PROGRAM

## 2023-06-19 PROCEDURE — 6360000004 HC RX CONTRAST MEDICATION: Performed by: INTERNAL MEDICINE

## 2023-06-19 PROCEDURE — 93005 ELECTROCARDIOGRAM TRACING: CPT | Performed by: INTERNAL MEDICINE

## 2023-06-19 PROCEDURE — 71275 CT ANGIOGRAPHY CHEST: CPT

## 2023-06-19 PROCEDURE — 99223 1ST HOSP IP/OBS HIGH 75: CPT | Performed by: INTERNAL MEDICINE

## 2023-06-19 PROCEDURE — 99233 SBSQ HOSP IP/OBS HIGH 50: CPT | Performed by: INTERNAL MEDICINE

## 2023-06-19 PROCEDURE — 99233 SBSQ HOSP IP/OBS HIGH 50: CPT | Performed by: NURSE PRACTITIONER

## 2023-06-19 PROCEDURE — 6360000002 HC RX W HCPCS: Performed by: STUDENT IN AN ORGANIZED HEALTH CARE EDUCATION/TRAINING PROGRAM

## 2023-06-19 PROCEDURE — 2140000000 HC CCU INTERMEDIATE R&B

## 2023-06-19 PROCEDURE — 94640 AIRWAY INHALATION TREATMENT: CPT

## 2023-06-19 PROCEDURE — 94761 N-INVAS EAR/PLS OXIMETRY MLT: CPT

## 2023-06-19 PROCEDURE — 94669 MECHANICAL CHEST WALL OSCILL: CPT

## 2023-06-19 PROCEDURE — 6370000000 HC RX 637 (ALT 250 FOR IP): Performed by: PHYSICIAN ASSISTANT

## 2023-06-19 PROCEDURE — 2700000000 HC OXYGEN THERAPY PER DAY

## 2023-06-19 PROCEDURE — 36415 COLL VENOUS BLD VENIPUNCTURE: CPT

## 2023-06-19 PROCEDURE — 94660 CPAP INITIATION&MGMT: CPT

## 2023-06-19 PROCEDURE — 85027 COMPLETE CBC AUTOMATED: CPT

## 2023-06-19 PROCEDURE — 2580000003 HC RX 258: Performed by: STUDENT IN AN ORGANIZED HEALTH CARE EDUCATION/TRAINING PROGRAM

## 2023-06-19 RX ORDER — GUAIFENESIN 600 MG/1
1200 TABLET, EXTENDED RELEASE ORAL 2 TIMES DAILY
Status: DISCONTINUED | OUTPATIENT
Start: 2023-06-19 | End: 2023-06-21 | Stop reason: HOSPADM

## 2023-06-19 RX ORDER — FUROSEMIDE 20 MG/1
20 TABLET ORAL DAILY
Status: DISCONTINUED | OUTPATIENT
Start: 2023-06-19 | End: 2023-06-21 | Stop reason: HOSPADM

## 2023-06-19 RX ORDER — ALBUTEROL SULFATE 90 UG/1
2 AEROSOL, METERED RESPIRATORY (INHALATION) EVERY 6 HOURS PRN
COMMUNITY

## 2023-06-19 RX ORDER — AZITHROMYCIN 250 MG/1
500 TABLET, FILM COATED ORAL DAILY
Status: COMPLETED | OUTPATIENT
Start: 2023-06-19 | End: 2023-06-21

## 2023-06-19 RX ADMIN — SODIUM CHLORIDE, PRESERVATIVE FREE 10 ML: 5 INJECTION INTRAVENOUS at 19:53

## 2023-06-19 RX ADMIN — LISINOPRIL 5 MG: 5 TABLET ORAL at 09:36

## 2023-06-19 RX ADMIN — IPRATROPIUM BROMIDE AND ALBUTEROL SULFATE 1 DOSE: .5; 3 SOLUTION RESPIRATORY (INHALATION) at 12:05

## 2023-06-19 RX ADMIN — IPRATROPIUM BROMIDE AND ALBUTEROL SULFATE 1 DOSE: .5; 3 SOLUTION RESPIRATORY (INHALATION) at 07:39

## 2023-06-19 RX ADMIN — GUAIFENESIN 1200 MG: 600 TABLET, EXTENDED RELEASE ORAL at 19:53

## 2023-06-19 RX ADMIN — ENOXAPARIN SODIUM 30 MG: 100 INJECTION SUBCUTANEOUS at 22:57

## 2023-06-19 RX ADMIN — HYDROCHLOROTHIAZIDE 12.5 MG: 12.5 CAPSULE ORAL at 09:36

## 2023-06-19 RX ADMIN — AZITHROMYCIN DIHYDRATE 500 MG: 250 TABLET ORAL at 15:43

## 2023-06-19 RX ADMIN — IPRATROPIUM BROMIDE AND ALBUTEROL SULFATE 1 DOSE: .5; 3 SOLUTION RESPIRATORY (INHALATION) at 21:12

## 2023-06-19 RX ADMIN — FAMOTIDINE 20 MG: 20 TABLET ORAL at 09:37

## 2023-06-19 RX ADMIN — IOPAMIDOL 80 ML: 755 INJECTION, SOLUTION INTRAVENOUS at 08:58

## 2023-06-19 RX ADMIN — FAMOTIDINE 20 MG: 20 TABLET ORAL at 19:53

## 2023-06-19 RX ADMIN — PREDNISONE 40 MG: 20 TABLET ORAL at 09:36

## 2023-06-19 RX ADMIN — FUROSEMIDE 20 MG: 20 TABLET ORAL at 12:16

## 2023-06-19 RX ADMIN — SODIUM CHLORIDE, PRESERVATIVE FREE 10 ML: 5 INJECTION INTRAVENOUS at 09:38

## 2023-06-19 RX ADMIN — LURASIDONE HYDROCHLORIDE 120 MG: 40 TABLET, FILM COATED ORAL at 09:36

## 2023-06-19 RX ADMIN — DOCUSATE SODIUM 50 MG AND SENNOSIDES 8.6 MG 2 TABLET: 8.6; 5 TABLET, FILM COATED ORAL at 09:39

## 2023-06-19 RX ADMIN — ENOXAPARIN SODIUM 30 MG: 100 INJECTION SUBCUTANEOUS at 09:37

## 2023-06-19 RX ADMIN — IPRATROPIUM BROMIDE AND ALBUTEROL SULFATE 1 DOSE: .5; 3 SOLUTION RESPIRATORY (INHALATION) at 15:54

## 2023-06-19 NOTE — CARE COORDINATION
6/19/23, 2:45 PM EDT    DISCHARGE  Pedro Pastor day: 5  Location: -28/028-A Reason for admit: Erythrocytosis [D75.1]  EDELMIRA (acute kidney injury) ([facility] [address] [N17.9]  Lithium toxicity, accidental or unintentional, initial encounter [T58.312M]   Procedure:   6/14 CXR: Borderline heart size. Hyperinflated lungs with flattened hemidiaphragms, consistent with COPD. No acute findings. No infiltrates or effusions are seen. 6/14 KUB: Nonobstructive bowel gas pattern with a moderate amount of retained stool in the colon. 6/15 CXR: Diffuse increase in interstitial pulmonary markings and increased groundglass opacification in the lower lobes. Findings likely relate to pulmonary edema and/or multilobar pneumonia. 6/15 ECHO: EF 60-65%. Mild to Moderately enlarged right atrium size. Moderately enlarged right ventricle cavity. Right ventricle global systolic function is Mild to moderately reduced. 6/19 CTA chest: No filling defects are noted within the pulmonary arterial vasculature to suggest the presence of pulmonary embolus. The main pulmonary arterial trunk is dilated measuring 3.9 cm in diameter which can be seen in pulmonary arterial hypertension. Dependent atelectasis and areas of consolidation at the lung bases, left greater than right, could indicate pneumonia in the appropriate clinical setting. Barriers to Discharge: Transferred to  over the weekend. Hospitalist, Pulmonology, and Cardiology following. BiPAP at night/sleeping. O2 on at 2L/nc, sats 90%. Cardiology would like to do [address] on pt due to severe pulmonary HTN, pt is declining. Lovenox. DuoNeb q4hr [facility]. PCP: Radames Aviles  Readmission Risk Score: 11.7%  Patient Goals/Plan/Treatment Preferences: [facility] is from [facility]. [facility] following. Phone call from Kaylee Lizarraga NP with Pulmonology, requesting to find out if pt can return to his home with O2.  Milly GREENE calling [facility] to find out.     3:03 PM EDT  Pt can go to

## 2023-06-19 NOTE — CARE COORDINATION
6/19/23, 7:14 AM EDT    DISCHARGE BARRIERS        Patient transferred to  28. Report given to unit SW, Milly JUÁREZ, regarding discharge plan for this patient.

## 2023-06-19 NOTE — CONSULTS
[facility]    Patient's Name/Date of Birth: Alondra Guerrero / 1975 (71 y.o.)    Date: June 19, 2023     Referring Provider: Teodoro Romberg, APRN *    CHIEF COMPLAINT: AMS    Reason of consultation  Need RHC for possible pulm HTN noted on echo and resp failure      HPI: This is a pleasant 50 y.o. male with a PMH HFpEF, PHTN, COPD, and schizophrenia who initially presented with complaints of AMS and tremor, suspected secondary to lithium toxicity. The patient had been living at an assisted living center for adults and mental illness, where he manages his own medications. Patient endorsed taking it as directed. He developed tremor and shortness of breath, was found with elevated lithium levels. The patient was admitted for further evaluation management. During hospitalization he was noted to be hypoxic with SPO2 in the 70s-80s. He was escalated on supplementary oxygen. Imaging demonstrated pulmonary edema, he was given diuretics. He was eventually escalated to BiPAP but remained lethargic, minimally responsive with ABG showing pH 7.2 and PCO2 108. He was inevitably transferred to ICU, and tolerated BiPAP overnight. The patient was transferred again to stepdown and pulmonology was consulted. Cardiology was consulted for concerns of severe pulmonary hypertension. Cardiac history significant for last echo EF 60-65%, no regional wall motion abnormalities, mild TR IVC dilated, IVC respiratory change in dimension <50%, RVSP 80 mmHg 6/2023. Echo: No results found for this or any previous visit.        All labs, EKG's, diagnostic testing and images as well as cardiac cath, stress testing were reviewed during this encounter    Past Medical History:   Diagnosis Date    Depression     Elevated BP     Genital warts     History of broken nose     Nicotine dependence     Schizophrenia, schizo-affective ([facility] [address]      Past Surgical History:   Procedure Laterality Date

## 2023-06-19 NOTE — CARE COORDINATION
6/19/23, 2:58 PM EDT    DISCHARGE PLANNING EVALUATION    Spoke with Meghan at [facility]. They are able to take [facility] on home oxygen. Meghan does voice concerns that he will refuse it once he gets there.

## 2023-06-19 NOTE — PLAN OF CARE
Problem: Discharge Planning  Goal: Discharge to home or other facility with appropriate resources  Outcome: Progressing  Flowsheets (Taken 6/19/2023 0810)  Discharge to home or other facility with appropriate resources: Identify barriers to discharge with patient and caregiver     Problem: Chronic Conditions and Co-morbidities  Goal: Patient's chronic conditions and co-morbidity symptoms are monitored and maintained or improved  Outcome: Progressing  Flowsheets (Taken 6/19/2023 0810)  Care Plan - Patient's Chronic Conditions and Co-Morbidity Symptoms are Monitored and Maintained or Improved: Monitor and assess patient's chronic conditions and comorbid symptoms for stability, deterioration, or improvement     Problem: Pain  Goal: Verbalizes/displays adequate comfort level or baseline comfort level  Outcome: Progressing  Flowsheets (Taken 6/19/2023 0810)  Verbalizes/displays adequate comfort level or baseline comfort level: Encourage patient to monitor pain and request assistance     Problem: Safety - Adult  Goal: Free from fall injury  Outcome: Progressing  Flowsheets (Taken 6/19/2023 0810)  Free From Fall Injury: Instruct family/caregiver on patient safety     Problem: Skin/Tissue Integrity  Goal: Absence of new skin breakdown  Description: 1. Monitor for areas of redness and/or skin breakdown  2. Assess vascular access sites hourly  3. Every 4-6 hours minimum:  Change oxygen saturation probe site  4. Every 4-6 hours:  If on nasal continuous positive airway pressure, respiratory therapy assess nares and determine need for appliance change or resting period. Outcome: Progressing  Note: Ongoing assessment & interventions provided throughout shift. Skin assessments provided. Encouraging/assisting patient to turn as needed.       Problem: ABCDS Injury Assessment  Goal: Absence of physical injury  Outcome: Progressing  Flowsheets (Taken 6/19/2023 0810)  Absence of Physical Injury: Implement safety measures based

## 2023-06-19 NOTE — PLAN OF CARE
Problem: Discharge Planning  Goal: Discharge to home or other facility with appropriate resources  6/19/2023 1113 by Jaqueline Majano RN  Outcome: Progressing  Flowsheets (Taken 6/19/2023 0815)  Discharge to home or other facility with appropriate resources:   Identify barriers to discharge with patient and caregiver   Arrange for needed discharge resources and transportation as appropriate   Identify discharge learning needs (meds, wound care, etc)   Refer to discharge planning if patient needs post-hospital services based on physician order or complex needs related to functional status, cognitive ability or social support system  6/19/2023 0810 by Araceli Daly RN  Outcome: Progressing  Flowsheets (Taken 6/19/2023 0810)  Discharge to home or other facility with appropriate resources: Identify barriers to discharge with patient and caregiver     Problem: Chronic Conditions and Co-morbidities  Goal: Patient's chronic conditions and co-morbidity symptoms are monitored and maintained or improved  6/19/2023 1113 by Jaqueline Majano RN  Outcome: Progressing  Flowsheets (Taken 6/19/2023 0815)  Care Plan - Patient's Chronic Conditions and Co-Morbidity Symptoms are Monitored and Maintained or Improved:   Monitor and assess patient's chronic conditions and comorbid symptoms for stability, deterioration, or improvement   Collaborate with multidisciplinary team to address chronic and comorbid conditions and prevent exacerbation or deterioration   Update acute care plan with appropriate goals if chronic or comorbid symptoms are exacerbated and prevent overall improvement and discharge  6/19/2023 0810 by Araceli Daly RN  Outcome: Progressing  Flowsheets (Taken 6/19/2023 0810)  Care Plan - Patient's Chronic Conditions and Co-Morbidity Symptoms are Monitored and Maintained or Improved: Monitor and assess patient's chronic conditions and comorbid symptoms for stability, deterioration, or improvement     Problem: Pain  Goal:

## 2023-06-20 LAB
ANION GAP SERPL CALC-SCNC: 12 MEQ/L (ref 8–16)
BUN SERPL-MCNC: 20 MG/DL (ref 7–22)
CALCIUM SERPL-MCNC: 8.8 MG/DL (ref 8.5–10.5)
CHLORIDE SERPL-SCNC: 95 MEQ/L (ref 98–111)
CO2 SERPL-SCNC: 33 MEQ/L (ref 23–33)
CREAT SERPL-MCNC: 1 MG/DL (ref 0.4–1.2)
DEPRECATED RDW RBC AUTO: 54.4 FL (ref 35–45)
EKG ATRIAL RATE: 67 BPM
EKG ATRIAL RATE: 78 BPM
EKG P AXIS: 74 DEGREES
EKG P AXIS: 79 DEGREES
EKG P-R INTERVAL: 136 MS
EKG P-R INTERVAL: 142 MS
EKG Q-T INTERVAL: 384 MS
EKG Q-T INTERVAL: 454 MS
EKG QRS DURATION: 80 MS
EKG QRS DURATION: 82 MS
EKG QTC CALCULATION (BAZETT): 437 MS
EKG QTC CALCULATION (BAZETT): 479 MS
EKG R AXIS: -152 DEGREES
EKG R AXIS: 94 DEGREES
EKG T AXIS: 56 DEGREES
EKG T AXIS: 75 DEGREES
EKG VENTRICULAR RATE: 67 BPM
EKG VENTRICULAR RATE: 78 BPM
ERYTHROCYTE [DISTWIDTH] IN BLOOD BY AUTOMATED COUNT: 17.3 % (ref 11.5–14.5)
GFR SERPL CREATININE-BSD FRML MDRD: > 60 ML/MIN/1.73M2
GLUCOSE SERPL-MCNC: 112 MG/DL (ref 70–108)
HCT VFR BLD AUTO: 59.6 % (ref 42–52)
HGB BLD-MCNC: 17.1 GM/DL (ref 14–18)
MCH RBC QN AUTO: 26.6 PG (ref 26–33)
MCHC RBC AUTO-ENTMCNC: 28.7 GM/DL (ref 32.2–35.5)
MCV RBC AUTO: 92.8 FL (ref 80–94)
PLATELET # BLD AUTO: 183 THOU/MM3 (ref 130–400)
PMV BLD AUTO: 10.6 FL (ref 9.4–12.4)
POTASSIUM SERPL-SCNC: 4.3 MEQ/L (ref 3.5–5.2)
RBC # BLD AUTO: 6.42 MILL/MM3 (ref 4.7–6.1)
SODIUM SERPL-SCNC: 140 MEQ/L (ref 135–145)
WBC # BLD AUTO: 11.6 THOU/MM3 (ref 4.8–10.8)

## 2023-06-20 PROCEDURE — 6370000000 HC RX 637 (ALT 250 FOR IP): Performed by: NURSE PRACTITIONER

## 2023-06-20 PROCEDURE — 94669 MECHANICAL CHEST WALL OSCILL: CPT

## 2023-06-20 PROCEDURE — 2580000003 HC RX 258: Performed by: STUDENT IN AN ORGANIZED HEALTH CARE EDUCATION/TRAINING PROGRAM

## 2023-06-20 PROCEDURE — 6370000000 HC RX 637 (ALT 250 FOR IP): Performed by: PHYSICIAN ASSISTANT

## 2023-06-20 PROCEDURE — 99233 SBSQ HOSP IP/OBS HIGH 50: CPT | Performed by: NURSE PRACTITIONER

## 2023-06-20 PROCEDURE — 99232 SBSQ HOSP IP/OBS MODERATE 35: CPT | Performed by: INTERNAL MEDICINE

## 2023-06-20 PROCEDURE — 80048 BASIC METABOLIC PNL TOTAL CA: CPT

## 2023-06-20 PROCEDURE — 6370000000 HC RX 637 (ALT 250 FOR IP): Performed by: STUDENT IN AN ORGANIZED HEALTH CARE EDUCATION/TRAINING PROGRAM

## 2023-06-20 PROCEDURE — 36415 COLL VENOUS BLD VENIPUNCTURE: CPT

## 2023-06-20 PROCEDURE — 85027 COMPLETE CBC AUTOMATED: CPT

## 2023-06-20 PROCEDURE — 6360000002 HC RX W HCPCS: Performed by: STUDENT IN AN ORGANIZED HEALTH CARE EDUCATION/TRAINING PROGRAM

## 2023-06-20 PROCEDURE — 99232 SBSQ HOSP IP/OBS MODERATE 35: CPT | Performed by: NURSE PRACTITIONER

## 2023-06-20 PROCEDURE — 94660 CPAP INITIATION&MGMT: CPT

## 2023-06-20 PROCEDURE — 2700000000 HC OXYGEN THERAPY PER DAY

## 2023-06-20 PROCEDURE — 2140000000 HC CCU INTERMEDIATE R&B

## 2023-06-20 PROCEDURE — 94761 N-INVAS EAR/PLS OXIMETRY MLT: CPT

## 2023-06-20 PROCEDURE — 94640 AIRWAY INHALATION TREATMENT: CPT

## 2023-06-20 RX ORDER — AZITHROMYCIN 500 MG/1
500 TABLET, FILM COATED ORAL DAILY
Qty: 1 TABLET | Refills: 0 | Status: SHIPPED | OUTPATIENT
Start: 2023-06-21 | End: 2023-06-22

## 2023-06-20 RX ORDER — METOPROLOL SUCCINATE 25 MG/1
12.5 TABLET, EXTENDED RELEASE ORAL DAILY
Status: DISCONTINUED | OUTPATIENT
Start: 2023-06-20 | End: 2023-06-21 | Stop reason: HOSPADM

## 2023-06-20 RX ORDER — PREDNISONE 20 MG/1
40 TABLET ORAL DAILY
Qty: 2 TABLET | Refills: 0 | Status: SHIPPED | OUTPATIENT
Start: 2023-06-21 | End: 2023-06-22

## 2023-06-20 RX ADMIN — FAMOTIDINE 20 MG: 20 TABLET ORAL at 20:39

## 2023-06-20 RX ADMIN — GUAIFENESIN 1200 MG: 600 TABLET, EXTENDED RELEASE ORAL at 22:13

## 2023-06-20 RX ADMIN — ENOXAPARIN SODIUM 30 MG: 100 INJECTION SUBCUTANEOUS at 21:34

## 2023-06-20 RX ADMIN — SODIUM CHLORIDE, PRESERVATIVE FREE 10 ML: 5 INJECTION INTRAVENOUS at 20:39

## 2023-06-20 RX ADMIN — IPRATROPIUM BROMIDE AND ALBUTEROL SULFATE 1 DOSE: .5; 3 SOLUTION RESPIRATORY (INHALATION) at 12:45

## 2023-06-20 RX ADMIN — FUROSEMIDE 20 MG: 20 TABLET ORAL at 08:58

## 2023-06-20 RX ADMIN — PREDNISONE 40 MG: 20 TABLET ORAL at 08:57

## 2023-06-20 RX ADMIN — METOPROLOL SUCCINATE 12.5 MG: 25 TABLET, EXTENDED RELEASE ORAL at 10:30

## 2023-06-20 RX ADMIN — LISINOPRIL 5 MG: 5 TABLET ORAL at 08:57

## 2023-06-20 RX ADMIN — ENOXAPARIN SODIUM 30 MG: 100 INJECTION SUBCUTANEOUS at 10:28

## 2023-06-20 RX ADMIN — IPRATROPIUM BROMIDE AND ALBUTEROL SULFATE 1 DOSE: .5; 3 SOLUTION RESPIRATORY (INHALATION) at 08:00

## 2023-06-20 RX ADMIN — EMPAGLIFLOZIN 10 MG: 10 TABLET, FILM COATED ORAL at 10:30

## 2023-06-20 RX ADMIN — GUAIFENESIN 1200 MG: 600 TABLET, EXTENDED RELEASE ORAL at 08:58

## 2023-06-20 RX ADMIN — IPRATROPIUM BROMIDE AND ALBUTEROL SULFATE 1 DOSE: .5; 3 SOLUTION RESPIRATORY (INHALATION) at 16:48

## 2023-06-20 RX ADMIN — SODIUM CHLORIDE, PRESERVATIVE FREE 10 ML: 5 INJECTION INTRAVENOUS at 09:01

## 2023-06-20 RX ADMIN — FAMOTIDINE 20 MG: 20 TABLET ORAL at 08:58

## 2023-06-20 RX ADMIN — DOCUSATE SODIUM 50 MG AND SENNOSIDES 8.6 MG 2 TABLET: 8.6; 5 TABLET, FILM COATED ORAL at 08:58

## 2023-06-20 RX ADMIN — LURASIDONE HYDROCHLORIDE 120 MG: 40 TABLET, FILM COATED ORAL at 08:58

## 2023-06-20 RX ADMIN — AZITHROMYCIN DIHYDRATE 500 MG: 250 TABLET ORAL at 08:58

## 2023-06-20 RX ADMIN — IPRATROPIUM BROMIDE AND ALBUTEROL SULFATE 1 DOSE: .5; 3 SOLUTION RESPIRATORY (INHALATION) at 20:56

## 2023-06-20 ASSESSMENT — PAIN SCALES - GENERAL: PAINLEVEL_OUTOF10: 0

## 2023-06-20 NOTE — PLAN OF CARE
Problem: Respiratory - Adult  Goal: Achieves optimal ventilation and oxygenation  6/19/2023 2117 by Tenisha Campuzano RCP  Outcome: Progressing   Continue inhaled txs. Patient mutually agrees on goals.

## 2023-06-20 NOTE — PLAN OF CARE
Problem: Discharge Planning  Goal: Discharge to home or other facility with appropriate resources  6/20/2023 0057 by Mirela Lemus RN  Outcome: Progressing  Flowsheets (Taken 6/20/2023 0056)  Discharge to home or other facility with appropriate resources:   Identify barriers to discharge with patient and caregiver   Identify discharge learning needs (meds, wound care, etc)  Note: Patient is from [facility]     Problem: Chronic Conditions and Co-morbidities  Goal: Patient's chronic conditions and co-morbidity symptoms are monitored and maintained or improved  6/20/2023 0057 by Mirela Lemus RN  Outcome: Progressing  Flowsheets (Taken 6/20/2023 0057)  Care Plan - Patient's Chronic Conditions and Co-Morbidity Symptoms are Monitored and Maintained or Improved:   Monitor and assess patient's chronic conditions and comorbid symptoms for stability, deterioration, or improvement   Collaborate with multidisciplinary team to address chronic and comorbid conditions and prevent exacerbation or deterioration   Update acute care plan with appropriate goals if chronic or comorbid symptoms are exacerbated and prevent overall improvement and discharge     Problem: Pain  Goal: Verbalizes/displays adequate comfort level or baseline comfort level  6/20/2023 0057 by Mirela Lemus RN  Outcome: Progressing  Flowsheets (Taken 6/20/2023 0057)  Verbalizes/displays adequate comfort level or baseline comfort level:   Encourage patient to monitor pain and request assistance   Assess pain using appropriate pain scale     Problem: Safety - Adult  Goal: Free from fall injury  6/20/2023 0057 by Mirela Lemus RN  Outcome: Progressing  Note: Bed locked & in low position, call light in reach, side-rails up x2, bed/chair alarm utilized, non-slip socks on when ambulating, reminded patient to use call light to call for assistance. Problem: Skin/Tissue Integrity  Goal: Absence of new skin breakdown  Description: 1.   Monitor for areas of

## 2023-06-20 NOTE — DISCHARGE INSTRUCTIONS
Pulmonary Follow-up Chest X-ray  Go to outpatient radiology at [facility] 2 days prior to your appointment to obtain Chest X-ray prior to your appointment with Angela Weaver this Chest x-ray is done as walk-in procedure no scheduled time is required. Chronic Obstructive Pulmonary Disease (COPD): Care Instructions  Overview     Chronic obstructive pulmonary disease (COPD) is a lung disease that makes it hard to breathe. With COPD, the airways that lead to the lungs are narrowed, and the tiny air sacs in the lungs are damaged and lose their stretch. People with COPD have decreased airflow in and out of the lungs, which makes it hard to breathe. The airways also can get clogged with thick mucus. Cigarette smoking is a major cause of COPD. Although there is no cure for COPD, you can slow its progress. Following your treatment plan and taking care of yourself can help you feel better and live longer. Follow-up care is a key part of your treatment and safety. Be sure to make and go to all appointments, and call your doctor if you are having problems. It's also a good idea to know your test results and keep a list of the medicines you take. How can you care for yourself at home? Staying healthy    Do not smoke. This is the most important step you can take to prevent more damage to your lungs. If you need help quitting, talk to your doctor about stop-smoking programs and medicines. These can increase your chances of quitting for good. Avoid infections such as COVID-19, colds, and the flu. Wash your hands often. Get the pneumococcal and whooping cough (pertussis) vaccines. If you have had these vaccines before, ask your doctor if you need another dose. Get the flu vaccine every fall. Stay up to date on your COVID-19 vaccines. Avoid secondhand smoke and air pollution. Try to stay inside with your windows closed when air pollution is bad.    Medicines and oxygen therapy    Take your medicines

## 2023-06-20 NOTE — PLAN OF CARE
Problem: Respiratory - Adult  Goal: Clear lung sounds  Description: Patient agrees to goals  Outcome: Progressing  Note: Patient agrees to goals

## 2023-06-20 NOTE — PROGRESS NOTES
06/19/23 1433   Encounter Summary   Encounter Overview/Reason  Initial Encounter   Service Provided For: Patient   Referral/Consult From: [facility]   Support System Unknown   Last Encounter  06/19/23   Complexity of Encounter Low   Begin Time 1410   End Time  1420   Total Time Calculated 10 min   Encounter    Type Initial Screen/Assessment   Assessment/Intervention/Outcome   Assessment Calm   Intervention Active listening   Outcome Acceptance   Plan and Referrals   Plan/Referrals Continue to visit, (comment)     ASSESSMENT    The patient has EDELMIRA. He has a number of hospital problems. He is religiously unaffiliated. Chiki Galindo was asked if he has any spiritual care need, which he answered in the negative. He said he was not in need of prayer. I wished him well and a speedy recovery. OUTCOME  He was able to get the name of the  readily. He remembered to address me correctly. CARE PLAN  Visit Piper Roll from time to time.
A home oxygen evaluation has been completed. [x]Patient is an inpatient. It is expected that the patient will be discharged within the next 48 hours. Qualified provider to write order for home prescription if patient qualifies. Social service/care managers will arrange for home oxygen. If patient is active, arrange for Home Medical supplier to assess for Oxygen Conserving Device per pulse oximetry. []Patient is an outpatient. Results will be faxed to the ordering provider. Qualified provider to write order for home prescription if patient qualifies and arranges for home oxygen. Patient was placed on room air for 10 minutes. SpO2 was 86 % on room air at rest. Patients SpO2 was below 89% and qualified for home oxygen. Oxygen was applied at 2 lpm via nasal cannula to maintain a SpO2 between 90-92% while at rest. Actual SpO2 was 95 %. Patient can ambulate for exercise flow rate. Patients was ambulated, SpO2 was 92% on 3 lpm to maintain SpO2 between 90-92% while exercising.
Cardiology Progress Note      Patient:  Meri Rodríguez  YOB: 1975  MRN: [id number]   Acct: [de-identified]  Admit Date:  6/14/2023  Primary Cardiologist: Ranjeet Mathews MD  Seen by dr. Sparrow      Per prior cardiology consult note-  CHIEF COMPLAINT: AMS     Reason of consultation  Need RHC for possible pulm HTN noted on echo and resp failure        HPI: This is a pleasant 50 y.o. male with a PMH HFpEF, PHTN, COPD, and schizophrenia who initially presented with complaints of AMS and tremor, suspected secondary to lithium toxicity. The patient had been living at an assisted living center for adults and mental illness, where he manages his own medications. Patient endorsed taking it as directed. He developed tremor and shortness of breath, was found with elevated lithium levels. The patient was admitted for further evaluation management. During hospitalization he was noted to be hypoxic with SPO2 in the 70s-80s. He was escalated on supplementary oxygen. Imaging demonstrated pulmonary edema, he was given diuretics. He was eventually escalated to BiPAP but remained lethargic, minimally responsive with ABG showing pH 7.2 and PCO2 108. He was inevitably transferred to ICU, and tolerated BiPAP overnight. The patient was transferred again to stepdown and pulmonology was consulted. Cardiology was consulted for concerns of severe pulmonary hypertension. Cardiac history significant for last echo EF 60-65%, no regional wall motion abnormalities, mild TR IVC dilated, IVC respiratory change in dimension <50%, RVSP 80 mmHg 6/2023.        Subjective (Events in last 24 hours):   Pt up in chair   No chest pain - states SOB improved   VSS  Tele SR no ectopy     Pt on NC still       Objective:   /70   Pulse 75   Temp 97.7 °F (36.5 °C) (Oral)   Resp 16   Ht 6' 4\" (1.93 m)   Wt 250 lb 14.4 oz (113.8 kg)   SpO2 91%   BMI 30.54 kg/m²        TELEMETRY: SR no ectopy     Physical Exam:  General Appearance:
[facility]      Patient - Bennie Erazo   MRN -  [id number]   [facility] - [facility]de-identified[facility]   - 1975      Date of Admission -  2023  2:32 PM  Date of evaluation -  2023  Room - 3B-028-A   Hospital Day -  [address], APRN * Primary Care Physician - Sherman Edouard     Problem List      Active Hospital Problems    Diagnosis Date Noted    Lithium toxicity [T56.891A] 06/15/2023    EDELMIRA (acute kidney injury) ([facility] [facility] [N17.9] 2023    CHF (congestive heart failure), NYHA class III, acute on chronic, combined ([facility] [address] [I50.43] 2019    HTN (hypertension) [I10] 2014    Schizophrenia ([facility] [address] [F20.9] 2014     Reason for Consult    Acute on chronic respiratory failure with hypoxia and hypercapnia  HPI   History Obtained From: Patient and electronic medical record. Bennie Erazo is a 50 y.o. male  PMH significant Combined heart failure per records, HTN, Schizophrenia. Patient transferred from [facility] for further evaluation of lithium toxicity. Patient resides at an assisted living center for adults with mental illness. Reportedly he manages his own medications. Reportedly developed tremor over past several weeks. Patient was transferred to [facility] for further evaluation of hypoxia. In ED patient was placed on BiPAP for ABG PH 7.25, PCO2-95, PO2-62, HCO3-41. Transferred to ICU on  with in place BiPAP. Underwent diuresis for elevated ProBNP developed EDELMIRA. Patient diuretic was held and creatinine improved. Was moved to step down and had noted confusion at the time of my evaluation. Interviewed while on BiPAP HPI limited 2/2 BiPAP and AMS.        Past 24 hrs   -CTA Negative for PE  -On 2 LPM via Nc   -tolerated BiPAP overnight   -Complains of thick secretions, afebrile mild leukocytosis noted  All other systems reviewed    PMHx   Past Medical History      Diagnosis Date    Depression     Elevated BP
PT spoke with sister and father about RHC procedure. PT still refusing to have procedure done at this time.
Pharmacy Medication History Note    List of current medications patient is taking is complete. Source of information: Patient and surescripts    Changes made to medication list:  Medications removed (include reason, ex. therapy complete or physician discontinued):  None    Medications added/doses adjusted:  Albuterol Inhaler 90 mcg prn q4h SOB    Other notes (ex. Recent course of antibiotics, Coumadin dosing):  Uses albuterol inhaler maybe 2x a week at most  Denies use of other OTC or herbal medications.     Allergies reviewed    Electronically signed by Elliot Larry on 6/19/2023 at 3:24 PM
167 173 170       Recent Labs     06/17/23  0650 06/18/23  0336 06/18/23  1459 06/19/23  0342    136  --  139   K 4.7 5.0  --  4.0   CL 94* 91*  --  95*   CO2 39* 35*  --  36*   BUN 14 15  --  19   CREATININE 1.1 1.0 1.1 1.0   CALCIUM 9.2 9.2  --  9.0       Microbiology:    Acinetobacter normal  MRSA screen negative    Urinalysis:      Lab Results   Component Value Date/Time    NITRU NEGATIVE 06/14/2023 05:44 PM    WBCUA 0-2 06/14/2023 05:44 PM    BACTERIA MODERATE 06/14/2023 05:44 PM    RBCUA 0-2 06/14/2023 05:44 PM    BLOODU NEGATIVE 06/14/2023 05:44 PM    SPECGRAV 1.020 06/14/2023 05:44 PM       Radiology:    Chest x-ray from 6/15/2023:   Diffuse increase in interstitial pulmonary markings and increased groundglass opacification in the lower lobes     Cardiac silhouette is mildly enlarged. No pleural effusion. No pneumothorax. No acute bony abnormality. IMPRESSION:  1. Diffuse increase in interstitial pulmonary markings and increased groundglass opacification in the lower lobes. Findings likely relate to pulmonary edema and/or multilobar pneumonia. XR CHEST (2 VW)    Result Date: 6/14/2023  PROCEDURE: XR CHEST (2 VW) CLINICAL INFORMATION: short of breath COMPARISON: No prior study. TECHNIQUE: PA and lateral views of the chest were obtained. 1. Borderline heart size. Hyperinflated lungs with flattened hemidiaphragms, consistent with COPD. 2. No acute findings. No infiltrates or effusions are seen. **This report has been created using voice recognition software. It may contain minor errors which are inherent in voice recognition technology. ** Final report electronically signed by Dr. Tima Larios on 6/14/2023 4:01 PM    XR ABDOMEN (KUB) (SINGLE AP VIEW)    Result Date: 6/14/2023  PROCEDURE: XR ABDOMEN (KUB) (SINGLE AP VIEW) CLINICAL INFORMATION: Abdominal distention TECHNIQUE: 3 AP supine abdominal radiographs COMPARISON: None FINDINGS: Bowel gas pattern is nonobstructive.  There
sounds. Musculoskeletal: passive and active ROM x 4 extremities. Skin: Skin color, texture, turgor normal.    Neurologic:  Neurovascularly intact without any focal sensory/motor deficits. Cranial nerves: II-XII intact, grossly non-focal.  Psychiatric: Alert and oriented to name, birthday, place and year  Capillary Refill: Brisk,< 3 seconds   Peripheral Pulses: +2 palpable, equal bilaterally     Labs:   Recent Labs     06/18/23  0336 06/19/23  0342 06/20/23 0333   WBC 12.6* 12.6* 11.6*   HGB 17.6 17.2 17.1   HCT 60.2* 58.6* 59.6*    170 183       Recent Labs     06/18/23  0336 06/18/23  1459 06/19/23 0342 06/20/23 0333     --  139 140   K 5.0  --  4.0 4.3   CL 91*  --  95* 95*   CO2 35*  --  36* 33   BUN 15  --  19 20   CREATININE 1.0 1.1 1.0 1.0   CALCIUM 9.2  --  9.0 8.8       Microbiology:    Acinetobacter normal  MRSA screen negative    Urinalysis:      Lab Results   Component Value Date/Time    NITRU NEGATIVE 06/14/2023 05:44 PM    WBCUA 0-2 06/14/2023 05:44 PM    BACTERIA MODERATE 06/14/2023 05:44 PM    RBCUA 0-2 06/14/2023 05:44 PM    BLOODU NEGATIVE 06/14/2023 05:44 PM    SPECGRAV 1.020 06/14/2023 05:44 PM       Radiology:    Chest x-ray from 6/15/2023:   Diffuse increase in interstitial pulmonary markings and increased groundglass opacification in the lower lobes     Cardiac silhouette is mildly enlarged. No pleural effusion. No pneumothorax. No acute bony abnormality. IMPRESSION:  1. Diffuse increase in interstitial pulmonary markings and increased groundglass opacification in the lower lobes. Findings likely relate to pulmonary edema and/or multilobar pneumonia. XR CHEST (2 VW)    Result Date: 6/14/2023  PROCEDURE: XR CHEST (2 VW) CLINICAL INFORMATION: short of breath COMPARISON: No prior study. TECHNIQUE: PA and lateral views of the chest were obtained. 1. Borderline heart size. Hyperinflated lungs with flattened hemidiaphragms, consistent with COPD.  2. No
below: He is on nasal cannula. Improving shortness of breath    Sleep medicine HPI/Evaluation:    Usual time to go to bed during the work/regular day of week: 10 PM  Usual time to wake up during the work//regular day of week: 8 AM    Sleep apnea symptoms:  Noticed to have loud snoring: Yes  Witnessed apneas during sleep noticed: No  History of choking and gasping sensation at night time: No  History of headaches in the morning:No  History of dry mouth in the morning: Yes  History of palpitations during night time/nocturnal awakenings: No  History of sweating during night time/nocturnal awakenings: No       General:  History of head injury in the past: No  History of seizures: No  Rest less legs syndrome symptoms:NO  History suggestive of periodic limb movements during sleep: NO  History suggestive of hypnagogic hallucinations: NO  History suggestive of hypnopompic hallucinations: NO  History suggestive of sleep talking:NO  History suggestive of sleep walking:NO  History suggestive of bruxism: NO             [] No mouth guard. [] Uses mouth guard. History suggestive of cataplexy: NO  History suggestive of sleep paralysis: NO    History regarding old sleep studies:  Prior history of sleep study: No  Using CPAP device: No  Currently using home Oxygen: NO.       Patient considerations:  Is the patient is ambulatory: Yes  Patient is currently using: None of these Wheelchair, Daryle Vitaly or [employer].   Para/Quadriplegic: NO  Hearing deficit : NO  Claustrophobic: NO  MDD : NO  Blind: NO  Incontinent: NO  Para/Quadraplegi: NO.   Need transportation to and from Sleep Center:NO    [place] Sleepiness Score:   Sitting and readin  Watching TV:3  Sitting inactive in a public place:1  Being a passenger in a motor vehicle for an hour or more:0  Lying down in the afternoon:3  Sitting and talking to someone:0  Sitting quietly after lunch (no alcohol):3  Stopped for a few minutes in traffic while drivin  Total Score:

## 2023-06-20 NOTE — CARE COORDINATION
6/20/23, 8:16 AM EDT    [address] day: 6  Location: -28/028-A Reason for admit: Erythrocytosis [D75.1]  EDELMIRA (acute kidney injury) ([facility] [address] [N17.9]  Lithium toxicity, accidental or unintentional, initial encounter [T56.047T]   Procedure:   6/14 CXR: Borderline heart size. Hyperinflated lungs with flattened hemidiaphragms, consistent with COPD. No acute findings. No infiltrates or effusions are seen. 6/14 KUB: Nonobstructive bowel gas pattern with a moderate amount of retained stool in the colon. 6/15 CXR: Diffuse increase in interstitial pulmonary markings and increased groundglass opacification in the lower lobes. Findings likely relate to pulmonary edema and/or multilobar pneumonia. 6/15 ECHO: EF 60-65%. Mild to Moderately enlarged right atrium size. Moderately enlarged right ventricle cavity. Right ventricle global systolic function is Mild to moderately reduced. 6/19 CTA chest: No filling defects are noted within the pulmonary arterial vasculature to suggest the presence of pulmonary embolus. The main pulmonary arterial trunk is dilated measuring 3.9 cm in diameter which can be seen in pulmonary arterial hypertension. Dependent atelectasis and areas of consolidation at the lung bases, left greater than right, could indicate pneumonia in the appropriate clinical setting. Barriers to Discharge: Hospitalist, Pulmonology and Cardiology following. BiPAP at night, O2 2-3L/nc, during day Sats 94-97%. IS. Acapella. Cardiology wants to do RHC, but pt has been declining. Zithromax po daily, Lovenox. Pepcid, Lasix, Mucinex, DuoNeb q4hr WA, Lisinopril, Latuda, Nicotine patch, Prednisone, Inderal (held), Senokot. PCP: Segun Marinelli  Readmission Risk Score: 11.9%  Patient Goals/Plan/Treatment Preferences: Plan return to [address] SW following. Plan home O2 eval prior to discharge. If needed, prefer [facility].      Discussed with Hospitalist and

## 2023-06-21 VITALS
SYSTOLIC BLOOD PRESSURE: 120 MMHG | DIASTOLIC BLOOD PRESSURE: 86 MMHG | OXYGEN SATURATION: 91 % | BODY MASS INDEX: 29.41 KG/M2 | WEIGHT: 241.5 LBS | TEMPERATURE: 98.2 F | HEART RATE: 85 BPM | RESPIRATION RATE: 20 BRPM | HEIGHT: 76 IN

## 2023-06-21 PROCEDURE — 6370000000 HC RX 637 (ALT 250 FOR IP): Performed by: NURSE PRACTITIONER

## 2023-06-21 PROCEDURE — 2580000003 HC RX 258: Performed by: STUDENT IN AN ORGANIZED HEALTH CARE EDUCATION/TRAINING PROGRAM

## 2023-06-21 PROCEDURE — 6370000000 HC RX 637 (ALT 250 FOR IP): Performed by: PHYSICIAN ASSISTANT

## 2023-06-21 PROCEDURE — 6360000002 HC RX W HCPCS: Performed by: STUDENT IN AN ORGANIZED HEALTH CARE EDUCATION/TRAINING PROGRAM

## 2023-06-21 PROCEDURE — 94640 AIRWAY INHALATION TREATMENT: CPT

## 2023-06-21 PROCEDURE — 94761 N-INVAS EAR/PLS OXIMETRY MLT: CPT

## 2023-06-21 PROCEDURE — 94669 MECHANICAL CHEST WALL OSCILL: CPT

## 2023-06-21 PROCEDURE — 6370000000 HC RX 637 (ALT 250 FOR IP): Performed by: STUDENT IN AN ORGANIZED HEALTH CARE EDUCATION/TRAINING PROGRAM

## 2023-06-21 PROCEDURE — 2700000000 HC OXYGEN THERAPY PER DAY

## 2023-06-21 PROCEDURE — 99239 HOSP IP/OBS DSCHRG MGMT >30: CPT | Performed by: INTERNAL MEDICINE

## 2023-06-21 RX ORDER — METOPROLOL SUCCINATE 25 MG/1
12.5 TABLET, EXTENDED RELEASE ORAL DAILY
Qty: 30 TABLET | Refills: 3 | Status: SHIPPED | OUTPATIENT
Start: 2023-06-22

## 2023-06-21 RX ORDER — FUROSEMIDE 20 MG/1
20 TABLET ORAL DAILY
Qty: 60 TABLET | Refills: 3 | Status: SHIPPED | OUTPATIENT
Start: 2023-06-22

## 2023-06-21 RX ORDER — LURASIDONE HYDROCHLORIDE 120 MG/1
120 TABLET, FILM COATED ORAL DAILY
Qty: 30 TABLET | Refills: 3 | Status: SHIPPED | OUTPATIENT
Start: 2023-06-21

## 2023-06-21 RX ORDER — SENNA AND DOCUSATE SODIUM 50; 8.6 MG/1; MG/1
2 TABLET, FILM COATED ORAL DAILY
Qty: 60 TABLET | Refills: 3 | Status: SHIPPED | OUTPATIENT
Start: 2023-06-22

## 2023-06-21 RX ORDER — NICOTINE 21 MG/24HR
1 PATCH, TRANSDERMAL 24 HOURS TRANSDERMAL DAILY
Qty: 30 PATCH | Refills: 3 | Status: SHIPPED | OUTPATIENT
Start: 2023-06-22

## 2023-06-21 RX ORDER — FAMOTIDINE 20 MG/1
20 TABLET, FILM COATED ORAL 2 TIMES DAILY
Qty: 60 TABLET | Refills: 3 | Status: SHIPPED | OUTPATIENT
Start: 2023-06-21

## 2023-06-21 RX ADMIN — SODIUM CHLORIDE, PRESERVATIVE FREE 10 ML: 5 INJECTION INTRAVENOUS at 08:23

## 2023-06-21 RX ADMIN — GUAIFENESIN 1200 MG: 600 TABLET, EXTENDED RELEASE ORAL at 08:24

## 2023-06-21 RX ADMIN — IPRATROPIUM BROMIDE AND ALBUTEROL SULFATE 1 DOSE: .5; 3 SOLUTION RESPIRATORY (INHALATION) at 11:01

## 2023-06-21 RX ADMIN — AZITHROMYCIN DIHYDRATE 500 MG: 250 TABLET ORAL at 08:24

## 2023-06-21 RX ADMIN — EMPAGLIFLOZIN 10 MG: 10 TABLET, FILM COATED ORAL at 08:24

## 2023-06-21 RX ADMIN — PREDNISONE 40 MG: 20 TABLET ORAL at 08:25

## 2023-06-21 RX ADMIN — LURASIDONE HYDROCHLORIDE 120 MG: 40 TABLET, FILM COATED ORAL at 08:25

## 2023-06-21 RX ADMIN — LISINOPRIL 5 MG: 5 TABLET ORAL at 08:25

## 2023-06-21 RX ADMIN — ENOXAPARIN SODIUM 30 MG: 100 INJECTION SUBCUTANEOUS at 13:24

## 2023-06-21 RX ADMIN — METOPROLOL SUCCINATE 12.5 MG: 25 TABLET, EXTENDED RELEASE ORAL at 08:27

## 2023-06-21 RX ADMIN — IPRATROPIUM BROMIDE AND ALBUTEROL SULFATE 1 DOSE: .5; 3 SOLUTION RESPIRATORY (INHALATION) at 07:40

## 2023-06-21 RX ADMIN — DOCUSATE SODIUM 50 MG AND SENNOSIDES 8.6 MG 2 TABLET: 8.6; 5 TABLET, FILM COATED ORAL at 08:25

## 2023-06-21 RX ADMIN — FAMOTIDINE 20 MG: 20 TABLET ORAL at 08:24

## 2023-06-21 RX ADMIN — FUROSEMIDE 20 MG: 20 TABLET ORAL at 08:24

## 2023-06-21 ASSESSMENT — PAIN SCALES - GENERAL: PAINLEVEL_OUTOF10: 0

## 2023-06-21 NOTE — CARE COORDINATION
6/21/23, 1:19 PM EDT    Patient goals/plan/ treatment preferences discussed by  and . Patient goals/plan/ treatment preferences reviewed with patient/ family. Patient/ family verbalize understanding of discharge plan and are in agreement with goal/plan/treatment preferences. Understanding was demonstrated using the teach back method. AVS provided by RN at time of discharge, which includes all necessary medical information pertaining to the patients current course of illness, treatment, post-discharge goals of care, and treatment preferences. Services At/After Discharge: Assisted Living       IMM Letter  IMM Letter given to Patient/Family/Significant other/Guardian/POA/by[de-identified] Ofelia Negron RN CM  IMM Letter date given[de-identified] 06/21/23  IMM Letter time given[de-identified] 6223     Claudene Groves will be discharged back [facility]. He will be transported by ambulette. Discharge instructions and transport forms are attached to blue discharge packet. Spoke with patient and he is agreeable to plan. Called his sister Flores Hernandez to make her aware of discharge. Meghan at the facility reports they are ready to accept.

## 2023-06-21 NOTE — PLAN OF CARE
Problem: Respiratory - Adult  Goal: Achieves optimal ventilation and oxygenation  Outcome: Progressing     Problem: Respiratory - Adult  Goal: Clear lung sounds  Outcome: Progressing   Patient is receiving ipratropium-albuterol to promote and preserve a clear, open airway. Patient breath sounds improved post-treatment and will continue to improve with further therapy. Patient mutually agreed on goals.

## 2023-06-21 NOTE — PLAN OF CARE
Problem: Discharge Planning  Goal: Discharge to home or other facility with appropriate resources  6/21/2023 1326 by Felisa Park RN  Outcome: Progressing  Flowsheets (Taken 6/21/2023 0212 by Eddie Colvin RN)  Discharge to home or other facility with appropriate resources:   Identify barriers to discharge with patient and caregiver   Arrange for needed discharge resources and transportation as appropriate   Identify discharge learning needs (meds, wound care, etc)   Refer to discharge planning if patient needs post-hospital services based on physician order or complex needs related to functional status, cognitive ability or social support system  Note: He is from [address] [place] and plans on returning there at discharge today.     6/21/2023 0212 by Eddie Colvin RN  Outcome: Progressing  Flowsheets (Taken 6/21/2023 0212)  Discharge to home or other facility with appropriate resources:   Identify barriers to discharge with patient and caregiver   Arrange for needed discharge resources and transportation as appropriate   Identify discharge learning needs (meds, wound care, etc)   Refer to discharge planning if patient needs post-hospital services based on physician order or complex needs related to functional status, cognitive ability or social support system     Problem: Chronic Conditions and Co-morbidities  Goal: Patient's chronic conditions and co-morbidity symptoms are monitored and maintained or improved  6/21/2023 1326 by Felisa Park RN  Outcome: Progressing  Flowsheets (Taken 6/21/2023 0212 by Eddie Colvin RN)  Care Plan - Patient's Chronic Conditions and Co-Morbidity Symptoms are Monitored and Maintained or Improved:   Monitor and assess patient's chronic conditions and comorbid symptoms for stability, deterioration, or improvement   Collaborate with multidisciplinary team to address chronic and comorbid conditions and prevent exacerbation or deterioration   Update acute

## 2023-06-21 NOTE — PLAN OF CARE
Problem: Discharge Planning  Goal: Discharge to home or other facility with appropriate resources  Outcome: Progressing  Flowsheets (Taken 6/21/2023 0212)  Discharge to home or other facility with appropriate resources:   Identify barriers to discharge with patient and caregiver   Arrange for needed discharge resources and transportation as appropriate   Identify discharge learning needs (meds, wound care, etc)   Refer to discharge planning if patient needs post-hospital services based on physician order or complex needs related to functional status, cognitive ability or social support system     Problem: Chronic Conditions and Co-morbidities  Goal: Patient's chronic conditions and co-morbidity symptoms are monitored and maintained or improved  Outcome: Progressing  Flowsheets (Taken 6/21/2023 0212)  Care Plan - Patient's Chronic Conditions and Co-Morbidity Symptoms are Monitored and Maintained or Improved:   Monitor and assess patient's chronic conditions and comorbid symptoms for stability, deterioration, or improvement   Collaborate with multidisciplinary team to address chronic and comorbid conditions and prevent exacerbation or deterioration   Update acute care plan with appropriate goals if chronic or comorbid symptoms are exacerbated and prevent overall improvement and discharge     Problem: Pain  Goal: Verbalizes/displays adequate comfort level or baseline comfort level  Outcome: Progressing  Flowsheets (Taken 6/21/2023 0212)  Verbalizes/displays adequate comfort level or baseline comfort level:   Encourage patient to monitor pain and request assistance   Assess pain using appropriate pain scale   Administer analgesics based on type and severity of pain and evaluate response   Implement non-pharmacological measures as appropriate and evaluate response   Consider cultural and social influences on pain and pain management   Notify Licensed Independent Practitioner if interventions unsuccessful or patient

## 2023-06-21 NOTE — DISCHARGE SUMMARY
Hospital Medicine Discharge Summary      Patient Identification:   Maddie Ling   : 1975  MRN: [id number]   Account: [de-identified]      Patient's PCP: Segun Marinelli    Admit Date: 2023     Discharge Date:   23    Admitting Physician: Lester Caputo PA-C     Discharge Physician: Vimal Miranda MD     Discharge Diagnoses: Acute Hypoxic Resp. Failure 2/2 COPD and HFpEF Exacerbation    Active Hospital Problems    Diagnosis Date Noted    Pulmonary HTN ([facility] [address] [I27.20] 2023    Acute on chronic respiratory failure with hypercapnia (HCC) [J96.22] 2023    Acute on chronic diastolic congestive heart failure ([address] [I50.33] 2023    Sleep apnea [G47.30] 2023    Lithium toxicity [T56.891A] 06/15/2023    EDELMIRA (acute kidney injury) ([facility] [address] [N17.9] 2023    CHF (congestive heart failure), NYHA class III, acute on chronic, combined ([address] [I50.43] 2019    HTN (hypertension) [I10] 2014    Schizophrenia ([facility] [address] [F20.9] 2014       The patient was seen and examined on day of discharge and this discharge summary is in conjunction with any daily progress note from day of discharge. Hospital Course:     Patient is transferred from [facility] for further evaluation of lithium toxicity. Patient has been living at an assisted living center for adults with mental illness. The patient does manage his own medications but reports he has been taking them as directed. The patient has recently developed a tremor and some shortness of breath. The patient's lithium level is found to be elevated. Pt found to be sob. Treated for COPD and HfpEF exacerbation. Psychiatry consulted, discontinued Lithium. Pt qualified for home O2.      Assessment/Plan:     Acute on possibly chronic hypoxic/hypercapnic respiratory failure--discussed with Naa Hammond from pulmonology and no need to monitor overnight on oxygen (has been using BiPAP at night) as patient will be doing an

## 2023-06-21 NOTE — FLOWSHEET NOTE
Called report to [employer], the nurse at [facility]. Refaxed AVS to her.

## 2023-06-21 NOTE — FLOWSHEET NOTE
Discussed discharge summary with patient. Instructed patient about medications & follow up appointments. Patient denies any additional questions. Patient was discharged with all belongings (except he isn't sure if he came with shoes or not). No distress noted. Patient discharged to [facility] per ambulette. Taken down to the vehicle by tech per wheelchair.

## 2023-06-21 NOTE — PLAN OF CARE
Problem: Respiratory - Adult  Goal: Clear lung sounds  Description: Patient agrees to goals  6/21/2023 0747 by María Elena Reed RCP  Outcome: Progressing   Duoneb and Acapella to improve breath sounds. Patient mutually agreed on goals.

## 2023-07-20 ENCOUNTER — TELEPHONE (OUTPATIENT)
Dept: CARDIOLOGY CLINIC | Age: 48
End: 2023-07-20

## 2023-07-20 NOTE — TELEPHONE ENCOUNTER
Patient no showed to chf clinic 7/20/23. Spoke to nurse Meghan  Patient resides at [facility] mental health. Was rencently in the hospital and stopped his lithium. Has not been well since- mental health wise. Having hallucinations, not wearing 02, risk of eloping if going out to appts. Dr Gomez Snu currently addressing psych meds. House doctor following all others.   Meghan asked to call and f/u next month to see if patient able to tolerate going to appt